# Patient Record
Sex: FEMALE | Race: WHITE | NOT HISPANIC OR LATINO
[De-identification: names, ages, dates, MRNs, and addresses within clinical notes are randomized per-mention and may not be internally consistent; named-entity substitution may affect disease eponyms.]

---

## 2017-01-03 ENCOUNTER — CHART COPY (OUTPATIENT)
Age: 37
End: 2017-01-03

## 2017-01-06 ENCOUNTER — APPOINTMENT (OUTPATIENT)
Dept: ORTHOPEDIC SURGERY | Facility: CLINIC | Age: 37
End: 2017-01-06

## 2017-01-22 ENCOUNTER — FORM ENCOUNTER (OUTPATIENT)
Age: 37
End: 2017-01-22

## 2017-01-23 ENCOUNTER — APPOINTMENT (OUTPATIENT)
Dept: INTERNAL MEDICINE | Facility: CLINIC | Age: 37
End: 2017-01-23

## 2017-01-23 ENCOUNTER — APPOINTMENT (OUTPATIENT)
Dept: RADIOLOGY | Facility: CLINIC | Age: 37
End: 2017-01-23

## 2017-01-23 ENCOUNTER — OUTPATIENT (OUTPATIENT)
Dept: OUTPATIENT SERVICES | Facility: HOSPITAL | Age: 37
LOS: 1 days | End: 2017-01-23
Payer: MEDICAID

## 2017-01-23 VITALS
WEIGHT: 232 LBS | HEART RATE: 83 BPM | RESPIRATION RATE: 14 BRPM | OXYGEN SATURATION: 98 % | HEIGHT: 69 IN | SYSTOLIC BLOOD PRESSURE: 130 MMHG | DIASTOLIC BLOOD PRESSURE: 90 MMHG | BODY MASS INDEX: 34.36 KG/M2 | TEMPERATURE: 98.3 F

## 2017-01-23 VITALS — DIASTOLIC BLOOD PRESSURE: 74 MMHG | SYSTOLIC BLOOD PRESSURE: 116 MMHG

## 2017-01-23 DIAGNOSIS — Z90.89 ACQUIRED ABSENCE OF OTHER ORGANS: Chronic | ICD-10-CM

## 2017-01-23 DIAGNOSIS — Z98.1 ARTHRODESIS STATUS: Chronic | ICD-10-CM

## 2017-01-23 DIAGNOSIS — J20.9 ACUTE BRONCHITIS, UNSPECIFIED: ICD-10-CM

## 2017-01-23 PROCEDURE — 71046 X-RAY EXAM CHEST 2 VIEWS: CPT

## 2017-01-23 RX ORDER — BUDESONIDE AND FORMOTEROL FUMARATE DIHYDRATE 160; 4.5 UG/1; UG/1
160-4.5 AEROSOL RESPIRATORY (INHALATION) TWICE DAILY
Qty: 1 | Refills: 0 | Status: COMPLETED | COMMUNITY
Start: 2017-01-23 | End: 2017-02-02

## 2017-01-23 RX ORDER — CLARITHROMYCIN 500 MG/1
500 TABLET, FILM COATED ORAL TWICE DAILY
Qty: 20 | Refills: 0 | Status: COMPLETED | COMMUNITY
Start: 2017-01-23 | End: 2017-02-02

## 2017-01-23 RX ORDER — BENZONATATE 100 MG/1
100 CAPSULE ORAL
Qty: 30 | Refills: 0 | Status: COMPLETED | COMMUNITY
Start: 2017-01-23 | End: 2017-02-02

## 2017-02-03 ENCOUNTER — APPOINTMENT (OUTPATIENT)
Dept: INTERNAL MEDICINE | Facility: CLINIC | Age: 37
End: 2017-02-03

## 2017-05-26 ENCOUNTER — APPOINTMENT (OUTPATIENT)
Dept: ORTHOPEDIC SURGERY | Facility: CLINIC | Age: 37
End: 2017-05-26

## 2017-05-26 ENCOUNTER — NON-APPOINTMENT (OUTPATIENT)
Age: 37
End: 2017-05-26

## 2017-05-26 ENCOUNTER — APPOINTMENT (OUTPATIENT)
Dept: INTERNAL MEDICINE | Facility: CLINIC | Age: 37
End: 2017-05-26

## 2017-05-26 VITALS
HEART RATE: 54 BPM | HEIGHT: 69 IN | WEIGHT: 234 LBS | SYSTOLIC BLOOD PRESSURE: 110 MMHG | RESPIRATION RATE: 14 BRPM | DIASTOLIC BLOOD PRESSURE: 80 MMHG | BODY MASS INDEX: 34.66 KG/M2 | OXYGEN SATURATION: 99 % | TEMPERATURE: 98.2 F

## 2017-05-26 VITALS
BODY MASS INDEX: 34.8 KG/M2 | HEIGHT: 69 IN | WEIGHT: 235 LBS | SYSTOLIC BLOOD PRESSURE: 136 MMHG | DIASTOLIC BLOOD PRESSURE: 93 MMHG | HEART RATE: 60 BPM

## 2017-05-26 RX ORDER — ALBUTEROL SULFATE 90 UG/1
108 (90 BASE) AEROSOL, METERED RESPIRATORY (INHALATION)
Qty: 18 | Refills: 0 | Status: DISCONTINUED | COMMUNITY
Start: 2017-01-18 | End: 2017-05-26

## 2017-05-26 RX ORDER — PREDNISONE 20 MG/1
20 TABLET ORAL
Qty: 10 | Refills: 0 | Status: DISCONTINUED | COMMUNITY
Start: 2017-01-18 | End: 2017-05-26

## 2017-05-30 LAB
25(OH)D3 SERPL-MCNC: 27.8 NG/ML
ALBUMIN SERPL ELPH-MCNC: 4.4 G/DL
ALP BLD-CCNC: 58 U/L
ALT SERPL-CCNC: 20 U/L
ANION GAP SERPL CALC-SCNC: 16 MMOL/L
APPEARANCE: CLEAR
AST SERPL-CCNC: 16 U/L
BACTERIA: NEGATIVE
BASOPHILS # BLD AUTO: 0.03 K/UL
BASOPHILS NFR BLD AUTO: 0.5 %
BILIRUB SERPL-MCNC: 0.6 MG/DL
BILIRUBIN URINE: NEGATIVE
BLOOD URINE: NEGATIVE
BUN SERPL-MCNC: 13 MG/DL
CALCIUM SERPL-MCNC: 9.3 MG/DL
CHLORIDE SERPL-SCNC: 100 MMOL/L
CHOLEST SERPL-MCNC: 247 MG/DL
CHOLEST/HDLC SERPL: 2.7 RATIO
CO2 SERPL-SCNC: 24 MMOL/L
COLOR: YELLOW
CREAT SERPL-MCNC: 0.75 MG/DL
EOSINOPHIL # BLD AUTO: 0.05 K/UL
EOSINOPHIL NFR BLD AUTO: 0.8 %
FOLATE SERPL-MCNC: 11.5 NG/ML
GLUCOSE QUALITATIVE U: NORMAL MG/DL
GLUCOSE SERPL-MCNC: 84 MG/DL
HBA1C MFR BLD HPLC: 5.3 %
HCT VFR BLD CALC: 44 %
HDLC SERPL-MCNC: 92 MG/DL
HGB BLD-MCNC: 14.1 G/DL
HYALINE CASTS: 0 /LPF
IMM GRANULOCYTES NFR BLD AUTO: 0.2 %
KETONES URINE: NEGATIVE
LDLC SERPL CALC-MCNC: 143 MG/DL
LEUKOCYTE ESTERASE URINE: NEGATIVE
LYMPHOCYTES # BLD AUTO: 1.82 K/UL
LYMPHOCYTES NFR BLD AUTO: 29.8 %
MAN DIFF?: NORMAL
MCHC RBC-ENTMCNC: 29.6 PG
MCHC RBC-ENTMCNC: 32 GM/DL
MCV RBC AUTO: 92.4 FL
MICROSCOPIC-UA: NORMAL
MONOCYTES # BLD AUTO: 0.31 K/UL
MONOCYTES NFR BLD AUTO: 5.1 %
NEUTROPHILS # BLD AUTO: 3.89 K/UL
NEUTROPHILS NFR BLD AUTO: 63.6 %
NITRITE URINE: NEGATIVE
PH URINE: 7
PLATELET # BLD AUTO: 272 K/UL
POTASSIUM SERPL-SCNC: 4.6 MMOL/L
PROT SERPL-MCNC: 7.4 G/DL
PROTEIN URINE: NEGATIVE MG/DL
RBC # BLD: 4.76 M/UL
RBC # FLD: 13 %
RED BLOOD CELLS URINE: 1 /HPF
SODIUM SERPL-SCNC: 140 MMOL/L
SPECIFIC GRAVITY URINE: 1.01
SQUAMOUS EPITHELIAL CELLS: 1 /HPF
TRIGL SERPL-MCNC: 61 MG/DL
TSH SERPL-ACNC: 2.78 UIU/ML
UROBILINOGEN URINE: NORMAL MG/DL
VIT B12 SERPL-MCNC: 639 PG/ML
WBC # FLD AUTO: 6.11 K/UL
WHITE BLOOD CELLS URINE: 1 /HPF

## 2017-06-04 ENCOUNTER — TRANSCRIPTION ENCOUNTER (OUTPATIENT)
Age: 37
End: 2017-06-04

## 2017-07-19 ENCOUNTER — TRANSCRIPTION ENCOUNTER (OUTPATIENT)
Age: 37
End: 2017-07-19

## 2017-07-23 ENCOUNTER — TRANSCRIPTION ENCOUNTER (OUTPATIENT)
Age: 37
End: 2017-07-23

## 2017-08-02 ENCOUNTER — APPOINTMENT (OUTPATIENT)
Dept: ORTHOPEDIC SURGERY | Facility: CLINIC | Age: 37
End: 2017-08-02
Payer: COMMERCIAL

## 2017-08-02 VITALS
HEIGHT: 69 IN | BODY MASS INDEX: 34.96 KG/M2 | HEART RATE: 80 BPM | SYSTOLIC BLOOD PRESSURE: 125 MMHG | WEIGHT: 236 LBS | DIASTOLIC BLOOD PRESSURE: 85 MMHG

## 2017-08-02 PROCEDURE — 99213 OFFICE O/P EST LOW 20 MIN: CPT

## 2017-08-02 PROCEDURE — 72170 X-RAY EXAM OF PELVIS: CPT | Mod: 59

## 2017-08-02 PROCEDURE — 72110 X-RAY EXAM L-2 SPINE 4/>VWS: CPT

## 2017-08-02 PROCEDURE — 72050 X-RAY EXAM NECK SPINE 4/5VWS: CPT
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
## 2017-08-02 NOTE — PHYSICAL EXAM
[Limited] : is limited [Painful] : is painful [UE/LE] : Motor: 5/5 motor strength in bilateral upper & lower extremities [] : Sensory: [Poor Appearance] : well-appearing [Acute Distress] : not in acute distress [Abl Mood] : in a normal mood [Abl Affect] : with normal affect [Poor Coordination] : normal coordination [Disorientation] : oriented x 3 [Normal] : normal [Zamorano's Sign] : negative Zamorano's sign [SLR] : negative straight leg raise [Bicep] : biceps 2+ and symmetric bilaterally [B.R.] : biceps 2+ and symmetric bilaterally [Tricep] : triceps 2+ and symmetric bilaterally [Knee] : patellar 2+ and symmetric bilaterally [Ankle] : ankle 2+ and symmetric bilaterally [DP] : dorsalis pedis 2+ and symmetric bilaterally [PT] : posterior tibial 2+ and symmetric bilaterally [Rad] : radial 2+ and symmetric bilaterally [FreeTextEntry2] : The pt is awake, alert and oriented to self, place and time, is comfortable and in no acute distress. Gait examination reveals a narrow based, non-ataxic, non-antalgic gait. The pt can heel and toe walk without difficulty. No rashes or ecchymotic lesions noted over the neck, back and lower extremities bilaterally. No obvious abnormal spinal curvature in the sagittal and coronal planes. No tenderness over the cervical, thoracic or lumbar spine, or upper and lower extremity musculature. Paraspinal cervical tenderness. There is no sacroiliac tenderness bilaterally. No tenderness over the greater trochanter bilaterally. No atrophy or abnormal movements noted in the upper or lower extremities bilaterally. No swelling seen in the upper or lower extremities bilaterally. No joint laxity noted in the upper and lower extremity joints bilaterally.\par No cervical lymphadenopathy noted anteriorly. \par Full range of motion of both shoulders. Negative Spurling's sign bilaterally. There is a negative Neer's sign and Hawkin's sign bilaterally. \par Range of motion of hip is internal rotation 20 degrees,  30° external rotation without pain\par Negative straight leg raise to 60° in the supine position. No groin pain with hip internal rotation, negative BOBBY test bilaterally. There are 2+ DP pulses bilaterally. There is a negative Babinski sign and no clonus bilaterally in the upper or lower extremities. [de-identified] : extension of C spine and left lateral rotation limited. Flexion to her mid tibia and ext 30 dehrees with some lefrt sided low back pain [de-identified] : left trochanteric tenderness [de-identified] : 4 view cervical spine demonstrate no significant scoliosis. Straightening of cervical lordosis. No acute fractures. No dynamic instability between flexion and extension. Improvement of cervical lordosis in extension\par \par 4 views lumbar spine demonstrated left-sided lumbar curve with apex of the L1-2 level. Straightening of lumbar lordosis. Between flexion-extension no dynamic. No acute fractures.\par \par AP pelvis demonstrates normal appearance of the hips bilaterally. No significant degenerative changes. No acute fracture

## 2017-08-02 NOTE — CONSULT LETTER
[Dear  ___] : Dear  [unfilled], [I had the pleasure of evaluating your patient, [unfilled].] : I had the pleasure of evaluating your patient, [unfilled]. [FreeTextEntry2] : Imelda Hoover [FreeTextEntry1] : Thank you for this referral. I have enclosed my note for your review. Please feel free to contact my office if you have additional questions regarding this patient.\par \par Regards,\par Srinivas Diaz MD, FACS, FAAOS\par \par  of Orthopaedic Surgery\par [employer]\par Spinal Reconstruction Surgery\par Minimally Invasive Spinal Surgery\par [facility]
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
## 2017-08-25 ENCOUNTER — TRANSCRIPTION ENCOUNTER (OUTPATIENT)
Age: 37
End: 2017-08-25

## 2017-08-28 ENCOUNTER — APPOINTMENT (OUTPATIENT)
Dept: INTERNAL MEDICINE | Facility: CLINIC | Age: 37
End: 2017-08-28
Payer: MEDICAID

## 2017-08-28 VITALS
HEIGHT: 69 IN | TEMPERATURE: 99 F | DIASTOLIC BLOOD PRESSURE: 90 MMHG | BODY MASS INDEX: 34.8 KG/M2 | RESPIRATION RATE: 14 BRPM | OXYGEN SATURATION: 98 % | HEART RATE: 83 BPM | WEIGHT: 235 LBS | SYSTOLIC BLOOD PRESSURE: 124 MMHG

## 2017-08-28 PROCEDURE — 99214 OFFICE O/P EST MOD 30 MIN: CPT

## 2017-09-06 ENCOUNTER — OTHER (OUTPATIENT)
Age: 37
End: 2017-09-06

## 2017-09-06 ENCOUNTER — MEDICATION RENEWAL (OUTPATIENT)
Age: 37
End: 2017-09-06

## 2017-09-06 RX ORDER — MOMETASONE 50 UG/1
50 SPRAY, METERED NASAL
Qty: 1 | Refills: 0 | Status: DISCONTINUED | COMMUNITY
Start: 2017-08-28 | End: 2017-09-06

## 2017-09-21 ENCOUNTER — APPOINTMENT (OUTPATIENT)
Dept: PEDIATRIC ALLERGY IMMUNOLOGY | Facility: CLINIC | Age: 37
End: 2017-09-21

## 2017-12-19 ENCOUNTER — TRANSCRIPTION ENCOUNTER (OUTPATIENT)
Age: 37
End: 2017-12-19

## 2018-01-24 ENCOUNTER — TRANSCRIPTION ENCOUNTER (OUTPATIENT)
Age: 38
End: 2018-01-24

## 2018-03-07 ENCOUNTER — APPOINTMENT (OUTPATIENT)
Dept: OTOLARYNGOLOGY | Facility: CLINIC | Age: 38
End: 2018-03-07

## 2018-03-31 ENCOUNTER — APPOINTMENT (OUTPATIENT)
Dept: OBGYN | Facility: CLINIC | Age: 38
End: 2018-03-31

## 2018-04-07 ENCOUNTER — RESULT REVIEW (OUTPATIENT)
Age: 38
End: 2018-04-07

## 2018-04-23 ENCOUNTER — TRANSCRIPTION ENCOUNTER (OUTPATIENT)
Age: 38
End: 2018-04-23

## 2018-06-05 ENCOUNTER — APPOINTMENT (OUTPATIENT)
Dept: ORTHOPEDIC SURGERY | Facility: CLINIC | Age: 38
End: 2018-06-05
Payer: MEDICAID

## 2018-06-05 VITALS
DIASTOLIC BLOOD PRESSURE: 88 MMHG | HEART RATE: 68 BPM | BODY MASS INDEX: 35.55 KG/M2 | SYSTOLIC BLOOD PRESSURE: 132 MMHG | HEIGHT: 69 IN | WEIGHT: 240 LBS

## 2018-06-05 PROCEDURE — 99213 OFFICE O/P EST LOW 20 MIN: CPT

## 2018-06-05 PROCEDURE — 73610 X-RAY EXAM OF ANKLE: CPT | Mod: LT

## 2018-06-05 RX ORDER — METRONIDAZOLE 7.5 MG/G
0.75 GEL VAGINAL
Qty: 70 | Refills: 0 | Status: DISCONTINUED | COMMUNITY
Start: 2018-04-07

## 2018-06-05 RX ORDER — DICLOFENAC SODIUM 10 MG/G
1 GEL TOPICAL TWICE DAILY
Qty: 1 | Refills: 2 | Status: DISCONTINUED | COMMUNITY
Start: 2017-08-02 | End: 2018-06-05

## 2018-06-05 RX ORDER — SULFAMETHOXAZOLE AND TRIMETHOPRIM 800; 160 MG/1; MG/1
800-160 TABLET ORAL
Qty: 20 | Refills: 0 | Status: DISCONTINUED | COMMUNITY
Start: 2018-01-24

## 2018-06-05 RX ORDER — FLUTICASONE PROPIONATE 50 UG/1
50 SPRAY, METERED NASAL
Qty: 16 | Refills: 0 | Status: DISCONTINUED | COMMUNITY
Start: 2017-12-17

## 2018-06-05 RX ORDER — TRIAMCINOLONE ACETONIDE 55 UG/1
55 SPRAY, METERED NASAL DAILY
Qty: 1 | Refills: 0 | Status: DISCONTINUED | COMMUNITY
Start: 2017-09-06 | End: 2018-06-05

## 2018-06-05 RX ORDER — CELECOXIB 100 MG/1
100 CAPSULE ORAL
Qty: 60 | Refills: 2 | Status: DISCONTINUED | COMMUNITY
Start: 2017-08-02 | End: 2018-06-05

## 2018-06-05 RX ORDER — CYCLOBENZAPRINE HYDROCHLORIDE 10 MG/1
10 TABLET, FILM COATED ORAL
Qty: 60 | Refills: 2 | Status: DISCONTINUED | COMMUNITY
Start: 2017-08-02 | End: 2018-06-05

## 2018-06-05 RX ORDER — METHYLPREDNISOLONE 4 MG/1
4 TABLET ORAL
Qty: 21 | Refills: 0 | Status: DISCONTINUED | COMMUNITY
Start: 2018-04-23

## 2018-06-09 ENCOUNTER — FORM ENCOUNTER (OUTPATIENT)
Age: 38
End: 2018-06-09

## 2018-06-10 ENCOUNTER — OUTPATIENT (OUTPATIENT)
Dept: OUTPATIENT SERVICES | Facility: HOSPITAL | Age: 38
LOS: 1 days | End: 2018-06-10
Payer: MEDICAID

## 2018-06-10 ENCOUNTER — APPOINTMENT (OUTPATIENT)
Dept: MRI IMAGING | Facility: CLINIC | Age: 38
End: 2018-06-10
Payer: MEDICAID

## 2018-06-10 DIAGNOSIS — Z98.1 ARTHRODESIS STATUS: Chronic | ICD-10-CM

## 2018-06-10 DIAGNOSIS — M25.572 PAIN IN LEFT ANKLE AND JOINTS OF LEFT FOOT: ICD-10-CM

## 2018-06-10 DIAGNOSIS — Z90.89 ACQUIRED ABSENCE OF OTHER ORGANS: Chronic | ICD-10-CM

## 2018-06-10 PROCEDURE — 73721 MRI JNT OF LWR EXTRE W/O DYE: CPT

## 2018-06-10 PROCEDURE — 73721 MRI JNT OF LWR EXTRE W/O DYE: CPT | Mod: 26,LT

## 2018-06-19 ENCOUNTER — CHART COPY (OUTPATIENT)
Age: 38
End: 2018-06-19

## 2018-07-04 ENCOUNTER — TRANSCRIPTION ENCOUNTER (OUTPATIENT)
Age: 38
End: 2018-07-04

## 2018-07-20 ENCOUNTER — TRANSCRIPTION ENCOUNTER (OUTPATIENT)
Age: 38
End: 2018-07-20

## 2018-07-24 ENCOUNTER — APPOINTMENT (OUTPATIENT)
Dept: ORTHOPEDIC SURGERY | Facility: CLINIC | Age: 38
End: 2018-07-24
Payer: MEDICAID

## 2018-07-24 VITALS
SYSTOLIC BLOOD PRESSURE: 109 MMHG | HEART RATE: 73 BPM | DIASTOLIC BLOOD PRESSURE: 73 MMHG | WEIGHT: 240 LBS | BODY MASS INDEX: 35.55 KG/M2 | HEIGHT: 69 IN

## 2018-07-24 PROCEDURE — 99213 OFFICE O/P EST LOW 20 MIN: CPT

## 2018-09-18 ENCOUNTER — APPOINTMENT (OUTPATIENT)
Dept: ORTHOPEDIC SURGERY | Facility: CLINIC | Age: 38
End: 2018-09-18

## 2018-11-21 ENCOUNTER — TRANSCRIPTION ENCOUNTER (OUTPATIENT)
Age: 38
End: 2018-11-21

## 2019-01-04 ENCOUNTER — OTHER (OUTPATIENT)
Age: 39
End: 2019-01-04

## 2019-01-04 PROBLEM — N94.3 PREMENSTRUAL SYNDROME: Status: ACTIVE | Noted: 2019-01-04

## 2019-01-04 PROBLEM — N94.6 DYSMENORRHEA: Status: ACTIVE | Noted: 2019-01-04

## 2019-01-04 PROBLEM — Z83.3 FAMILY HISTORY OF DIABETES MELLITUS: Status: ACTIVE | Noted: 2019-01-04

## 2019-01-04 PROBLEM — Z80.3 FAMILY HISTORY OF MALIGNANT NEOPLASM OF BREAST: Status: ACTIVE | Noted: 2019-01-04

## 2019-01-04 PROBLEM — Z80.52 FAMILY HISTORY OF MALIGNANT NEOPLASM OF URINARY BLADDER: Status: ACTIVE | Noted: 2019-01-04

## 2019-01-04 PROBLEM — Z80.41 FAMILY HISTORY OF MALIGNANT NEOPLASM OF OVARY: Status: ACTIVE | Noted: 2019-01-04

## 2019-01-04 PROBLEM — Z80.1 FAMILY HISTORY OF LUNG CANCER: Status: ACTIVE | Noted: 2019-01-04

## 2019-01-04 LAB — CYTOLOGY CVX/VAG DOC THIN PREP: NORMAL

## 2019-01-07 ENCOUNTER — APPOINTMENT (OUTPATIENT)
Dept: OBGYN | Facility: CLINIC | Age: 39
End: 2019-01-07
Payer: MEDICAID

## 2019-01-07 ENCOUNTER — LABORATORY RESULT (OUTPATIENT)
Age: 39
End: 2019-01-07

## 2019-01-07 VITALS
DIASTOLIC BLOOD PRESSURE: 80 MMHG | HEIGHT: 69 IN | WEIGHT: 261 LBS | SYSTOLIC BLOOD PRESSURE: 120 MMHG | BODY MASS INDEX: 38.66 KG/M2

## 2019-01-07 DIAGNOSIS — Z80.41 FAMILY HISTORY OF MALIGNANT NEOPLASM OF OVARY: ICD-10-CM

## 2019-01-07 DIAGNOSIS — Z83.3 FAMILY HISTORY OF DIABETES MELLITUS: ICD-10-CM

## 2019-01-07 DIAGNOSIS — Z80.3 FAMILY HISTORY OF MALIGNANT NEOPLASM OF BREAST: ICD-10-CM

## 2019-01-07 DIAGNOSIS — N92.0 EXCESSIVE AND FREQUENT MENSTRUATION WITH REGULAR CYCLE: ICD-10-CM

## 2019-01-07 DIAGNOSIS — Z80.1 FAMILY HISTORY OF MALIGNANT NEOPLASM OF TRACHEA, BRONCHUS AND LUNG: ICD-10-CM

## 2019-01-07 DIAGNOSIS — N94.3 PREMENSTRUAL TENSION SYNDROME: ICD-10-CM

## 2019-01-07 DIAGNOSIS — N94.6 DYSMENORRHEA, UNSPECIFIED: ICD-10-CM

## 2019-01-07 DIAGNOSIS — Z80.52 FAMILY HISTORY OF MALIGNANT NEOPLASM OF BLADDER: ICD-10-CM

## 2019-01-07 LAB
BILIRUB UR QL STRIP: NORMAL
CLARITY UR: CLEAR
COLLECTION METHOD: NORMAL
GLUCOSE UR-MCNC: NORMAL
HCG UR QL: 0.2 EU/DL
HCG UR QL: NEGATIVE
HGB UR QL STRIP.AUTO: ABNORMAL
KETONES UR-MCNC: NORMAL
LEUKOCYTE ESTERASE UR QL STRIP: ABNORMAL
NITRITE UR QL STRIP: NORMAL
PH UR STRIP: 5.5
PROT UR STRIP-MCNC: NORMAL
QUALITY CONTROL: YES
SP GR UR STRIP: 1

## 2019-01-07 PROCEDURE — 36415 COLL VENOUS BLD VENIPUNCTURE: CPT

## 2019-01-07 PROCEDURE — 81025 URINE PREGNANCY TEST: CPT

## 2019-01-07 PROCEDURE — 99213 OFFICE O/P EST LOW 20 MIN: CPT

## 2019-01-07 PROCEDURE — 81003 URINALYSIS AUTO W/O SCOPE: CPT | Mod: QW

## 2019-01-07 NOTE — DISCUSSION/SUMMARY
[FreeTextEntry1] : we discussed options to help with her frequent menses. \par \par labs obtained and pt will return for a pelvic US.\par \par if labs and sono normal, pt will keep a menstrual calendar for the next 3 mos, and if menses continue to be q18 days she will return for management.  We discussed ocps vs IUD.

## 2019-01-07 NOTE — HISTORY OF PRESENT ILLNESS
[Irregular Menses] : irregular menses [Definite:  ___ (Date)] : the last menstrual period was [unfilled] [Menarche Age: ____] : age at menarche was [unfilled] [Sexually Active] : is sexually active [Contraception] : does not use contraception

## 2019-01-09 ENCOUNTER — APPOINTMENT (OUTPATIENT)
Dept: OBGYN | Facility: CLINIC | Age: 39
End: 2019-01-09
Payer: MEDICAID

## 2019-01-09 ENCOUNTER — ASOB RESULT (OUTPATIENT)
Age: 39
End: 2019-01-09

## 2019-01-09 PROCEDURE — 76830 TRANSVAGINAL US NON-OB: CPT

## 2019-01-10 LAB
BASOPHILS # BLD AUTO: 0.04 K/UL
BASOPHILS NFR BLD AUTO: 0.4 %
EOSINOPHIL # BLD AUTO: 0.14 K/UL
EOSINOPHIL NFR BLD AUTO: 1.3 %
HCT VFR BLD CALC: 42.3 %
HGB BLD-MCNC: 13.9 G/DL
IMM GRANULOCYTES NFR BLD AUTO: 0.3 %
LYMPHOCYTES # BLD AUTO: 3.1 K/UL
LYMPHOCYTES NFR BLD AUTO: 29.8 %
MAN DIFF?: NORMAL
MCHC RBC-ENTMCNC: 30.6 PG
MCHC RBC-ENTMCNC: 32.9 GM/DL
MCV RBC AUTO: 93.2 FL
MONOCYTES # BLD AUTO: 0.76 K/UL
MONOCYTES NFR BLD AUTO: 7.3 %
NEUTROPHILS # BLD AUTO: 6.34 K/UL
NEUTROPHILS NFR BLD AUTO: 60.9 %
PLATELET # BLD AUTO: 314 K/UL
PROLACTIN SERPL-MCNC: 18.9 NG/ML
RBC # BLD: 4.54 M/UL
RBC # FLD: 13.9 %
TSH SERPL-ACNC: 8.27 UIU/ML
WBC # FLD AUTO: 10.41 K/UL

## 2019-01-25 ENCOUNTER — LABORATORY RESULT (OUTPATIENT)
Age: 39
End: 2019-01-25

## 2019-01-25 ENCOUNTER — APPOINTMENT (OUTPATIENT)
Dept: INTERNAL MEDICINE | Facility: CLINIC | Age: 39
End: 2019-01-25
Payer: MEDICAID

## 2019-01-25 VITALS
HEART RATE: 78 BPM | OXYGEN SATURATION: 98 % | HEIGHT: 69 IN | SYSTOLIC BLOOD PRESSURE: 126 MMHG | DIASTOLIC BLOOD PRESSURE: 84 MMHG | TEMPERATURE: 98.1 F | RESPIRATION RATE: 14 BRPM

## 2019-01-25 PROCEDURE — 99213 OFFICE O/P EST LOW 20 MIN: CPT

## 2019-01-25 RX ORDER — MELOXICAM 15 MG/1
15 TABLET ORAL DAILY
Qty: 30 | Refills: 0 | Status: DISCONTINUED | COMMUNITY
Start: 2018-06-19 | End: 2019-01-25

## 2019-01-25 NOTE — HISTORY OF PRESENT ILLNESS
[de-identified] : pt here today for repeat thyroid check.\par Pt found to have low thyroid at GYN office\par Pt had menstrual irregular.

## 2019-01-28 LAB
T3RU NFR SERPL: 1.14 INDEX
T4 FREE SERPL-MCNC: 1.1 NG/DL
THYROGLOB AB SERPL-ACNC: <20 IU/ML
THYROPEROXIDASE AB SERPL IA-ACNC: <10 IU/ML
TSH SERPL-ACNC: 1.92 UIU/ML

## 2019-02-03 ENCOUNTER — TRANSCRIPTION ENCOUNTER (OUTPATIENT)
Age: 39
End: 2019-02-03

## 2019-02-28 ENCOUNTER — TRANSCRIPTION ENCOUNTER (OUTPATIENT)
Age: 39
End: 2019-02-28

## 2019-04-12 ENCOUNTER — APPOINTMENT (OUTPATIENT)
Dept: INTERNAL MEDICINE | Facility: CLINIC | Age: 39
End: 2019-04-12
Payer: MEDICAID

## 2019-04-12 VITALS
RESPIRATION RATE: 14 BRPM | WEIGHT: 260 LBS | TEMPERATURE: 97.8 F | SYSTOLIC BLOOD PRESSURE: 120 MMHG | OXYGEN SATURATION: 97 % | HEIGHT: 69 IN | DIASTOLIC BLOOD PRESSURE: 90 MMHG | HEART RATE: 68 BPM | BODY MASS INDEX: 38.51 KG/M2

## 2019-04-12 DIAGNOSIS — Z00.00 ENCOUNTER FOR GENERAL ADULT MEDICAL EXAMINATION W/OUT ABNORMAL FINDINGS: ICD-10-CM

## 2019-04-12 DIAGNOSIS — J30.9 ALLERGIC RHINITIS, UNSPECIFIED: ICD-10-CM

## 2019-04-12 PROCEDURE — 99395 PREV VISIT EST AGE 18-39: CPT

## 2019-04-12 RX ORDER — ONDANSETRON 4 MG/1
4 TABLET, ORALLY DISINTEGRATING ORAL
Qty: 6 | Refills: 0 | Status: DISCONTINUED | COMMUNITY
Start: 2019-02-28

## 2019-04-12 RX ORDER — FLUOXETINE HYDROCHLORIDE 20 MG/1
20 TABLET ORAL
Refills: 0 | Status: DISCONTINUED | COMMUNITY
End: 2019-04-12

## 2019-04-12 NOTE — PHYSICAL EXAM
[No Acute Distress] : no acute distress [Well Nourished] : well nourished [Well Developed] : well developed [Well-Appearing] : well-appearing [Normal Sclera/Conjunctiva] : normal sclera/conjunctiva [Normal Outer Ear/Nose] : the outer ears and nose were normal in appearance [PERRL] : pupils equal round and reactive to light [EOMI] : extraocular movements intact [No JVD] : no jugular venous distention [Normal Oropharynx] : the oropharynx was normal [Supple] : supple [No Lymphadenopathy] : no lymphadenopathy [Thyroid Normal, No Nodules] : the thyroid was normal and there were no nodules present [No Respiratory Distress] : no respiratory distress  [No Accessory Muscle Use] : no accessory muscle use [Clear to Auscultation] : lungs were clear to auscultation bilaterally [Regular Rhythm] : with a regular rhythm [Normal S1, S2] : normal S1 and S2 [Normal Rate] : normal rate  [No Murmur] : no murmur heard [No Carotid Bruits] : no carotid bruits [No Abdominal Bruit] : a ~M bruit was not heard ~T in the abdomen [No Varicosities] : no varicosities [Pedal Pulses Present] : the pedal pulses are present [No Edema] : there was no peripheral edema [No Extremity Clubbing/Cyanosis] : no extremity clubbing/cyanosis [No Palpable Aorta] : no palpable aorta [Normal Appearance] : normal in appearance [No Axillary Lymphadenopathy] : no axillary lymphadenopathy [Non Tender] : non-tender [Soft] : abdomen soft [Non-distended] : non-distended [No Masses] : no abdominal mass palpated [No HSM] : no HSM [Normal Bowel Sounds] : normal bowel sounds [Normal Anterior Cervical Nodes] : no anterior cervical lymphadenopathy [Normal Posterior Cervical Nodes] : no posterior cervical lymphadenopathy [No CVA Tenderness] : no CVA  tenderness [No Spinal Tenderness] : no spinal tenderness [No Joint Swelling] : no joint swelling [No Rash] : no rash [Grossly Normal Strength/Tone] : grossly normal strength/tone [No Focal Deficits] : no focal deficits [Coordination Grossly Intact] : coordination grossly intact [Normal Gait] : normal gait [Deep Tendon Reflexes (DTR)] : deep tendon reflexes were 2+ and symmetric [Normal Affect] : the affect was normal [Normal Insight/Judgement] : insight and judgment were intact

## 2019-04-13 ENCOUNTER — TRANSCRIPTION ENCOUNTER (OUTPATIENT)
Age: 39
End: 2019-04-13

## 2019-04-13 LAB
25(OH)D3 SERPL-MCNC: 23.8 NG/ML
ALBUMIN SERPL ELPH-MCNC: 4.5 G/DL
ALP BLD-CCNC: 57 U/L
ALT SERPL-CCNC: 13 U/L
ANION GAP SERPL CALC-SCNC: 13 MMOL/L
AST SERPL-CCNC: 16 U/L
BASOPHILS # BLD AUTO: 0.05 K/UL
BASOPHILS NFR BLD AUTO: 0.8 %
BILIRUB SERPL-MCNC: 0.4 MG/DL
BUN SERPL-MCNC: 13 MG/DL
CALCIUM SERPL-MCNC: 9.5 MG/DL
CHLORIDE SERPL-SCNC: 103 MMOL/L
CHOLEST SERPL-MCNC: 239 MG/DL
CHOLEST/HDLC SERPL: 2.9 RATIO
CO2 SERPL-SCNC: 25 MMOL/L
CREAT SERPL-MCNC: 0.81 MG/DL
EOSINOPHIL # BLD AUTO: 0.06 K/UL
EOSINOPHIL NFR BLD AUTO: 0.9 %
ESTIMATED AVERAGE GLUCOSE: 103 MG/DL
FERRITIN SERPL-MCNC: 171 NG/ML
FOLATE SERPL-MCNC: 15.2 NG/ML
GLUCOSE SERPL-MCNC: 87 MG/DL
HBA1C MFR BLD HPLC: 5.2 %
HCT VFR BLD CALC: 42.2 %
HDLC SERPL-MCNC: 82 MG/DL
HGB BLD-MCNC: 13.6 G/DL
IMM GRANULOCYTES NFR BLD AUTO: 0.3 %
LDLC SERPL CALC-MCNC: 144 MG/DL
LYMPHOCYTES # BLD AUTO: 1.94 K/UL
LYMPHOCYTES NFR BLD AUTO: 30.2 %
MAN DIFF?: NORMAL
MCHC RBC-ENTMCNC: 30.2 PG
MCHC RBC-ENTMCNC: 32.2 GM/DL
MCV RBC AUTO: 93.8 FL
MONOCYTES # BLD AUTO: 0.51 K/UL
MONOCYTES NFR BLD AUTO: 7.9 %
NEUTROPHILS # BLD AUTO: 3.85 K/UL
NEUTROPHILS NFR BLD AUTO: 59.9 %
PLATELET # BLD AUTO: 259 K/UL
POTASSIUM SERPL-SCNC: 4.7 MMOL/L
PROT SERPL-MCNC: 7.5 G/DL
RBC # BLD: 4.5 M/UL
RBC # FLD: 12.3 %
SODIUM SERPL-SCNC: 141 MMOL/L
T4 SERPL-MCNC: 5.8 UG/DL
THYROGLOB AB SERPL-ACNC: <20 IU/ML
THYROPEROXIDASE AB SERPL IA-ACNC: <10 IU/ML
TRIGL SERPL-MCNC: 65 MG/DL
TSH SERPL-ACNC: 2.75 UIU/ML
VIT B12 SERPL-MCNC: 878 PG/ML
WBC # FLD AUTO: 6.43 K/UL

## 2019-04-20 ENCOUNTER — EMERGENCY (EMERGENCY)
Facility: HOSPITAL | Age: 39
LOS: 0 days | Discharge: HOME | End: 2019-04-20
Attending: EMERGENCY MEDICINE | Admitting: EMERGENCY MEDICINE
Payer: MEDICAID

## 2019-04-20 ENCOUNTER — APPOINTMENT (OUTPATIENT)
Dept: OBGYN | Facility: CLINIC | Age: 39
End: 2019-04-20

## 2019-04-20 VITALS — HEIGHT: 69 IN | WEIGHT: 255.07 LBS

## 2019-04-20 VITALS
RESPIRATION RATE: 18 BRPM | SYSTOLIC BLOOD PRESSURE: 135 MMHG | DIASTOLIC BLOOD PRESSURE: 95 MMHG | TEMPERATURE: 98 F | HEART RATE: 64 BPM | OXYGEN SATURATION: 100 %

## 2019-04-20 DIAGNOSIS — Z90.89 ACQUIRED ABSENCE OF OTHER ORGANS: Chronic | ICD-10-CM

## 2019-04-20 DIAGNOSIS — Z88.0 ALLERGY STATUS TO PENICILLIN: ICD-10-CM

## 2019-04-20 DIAGNOSIS — Z98.1 ARTHRODESIS STATUS: Chronic | ICD-10-CM

## 2019-04-20 DIAGNOSIS — F07.81 POSTCONCUSSIONAL SYNDROME: ICD-10-CM

## 2019-04-20 DIAGNOSIS — R51 HEADACHE: ICD-10-CM

## 2019-04-20 PROCEDURE — 70450 CT HEAD/BRAIN W/O DYE: CPT | Mod: 26

## 2019-04-20 PROCEDURE — 99284 EMERGENCY DEPT VISIT MOD MDM: CPT

## 2019-04-20 RX ORDER — SODIUM CHLORIDE 9 MG/ML
1000 INJECTION INTRAMUSCULAR; INTRAVENOUS; SUBCUTANEOUS ONCE
Qty: 0 | Refills: 0 | Status: COMPLETED | OUTPATIENT
Start: 2019-04-20 | End: 2019-04-20

## 2019-04-20 RX ORDER — DIPHENHYDRAMINE HCL 50 MG
12.5 CAPSULE ORAL ONCE
Qty: 0 | Refills: 0 | Status: COMPLETED | OUTPATIENT
Start: 2019-04-20 | End: 2019-04-20

## 2019-04-20 RX ORDER — METOCLOPRAMIDE HCL 10 MG
10 TABLET ORAL ONCE
Qty: 0 | Refills: 0 | Status: COMPLETED | OUTPATIENT
Start: 2019-04-20 | End: 2019-04-20

## 2019-04-20 RX ORDER — KETOROLAC TROMETHAMINE 30 MG/ML
30 SYRINGE (ML) INJECTION ONCE
Qty: 0 | Refills: 0 | Status: DISCONTINUED | OUTPATIENT
Start: 2019-04-20 | End: 2019-04-20

## 2019-04-20 RX ADMIN — SODIUM CHLORIDE 1000 MILLILITER(S): 9 INJECTION INTRAMUSCULAR; INTRAVENOUS; SUBCUTANEOUS at 17:00

## 2019-04-20 RX ADMIN — Medication 12.5 MILLIGRAM(S): at 16:41

## 2019-04-20 RX ADMIN — Medication 30 MILLIGRAM(S): at 17:31

## 2019-04-20 RX ADMIN — SODIUM CHLORIDE 1000 MILLILITER(S): 9 INJECTION INTRAMUSCULAR; INTRAVENOUS; SUBCUTANEOUS at 16:32

## 2019-04-20 RX ADMIN — Medication 30 MILLIGRAM(S): at 17:43

## 2019-04-20 RX ADMIN — Medication 10 MILLIGRAM(S): at 16:41
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
## 2019-04-20 NOTE — ED ADULT NURSE NOTE - NSIMPLEMENTINTERV_GEN_ALL_ED
Implemented All Universal Safety Interventions:  [place] to call system. Call bell, personal items and telephone within reach. Instruct patient to call for assistance. Room bathroom lighting operational. Non-slip footwear when patient is off stretcher. Physically safe environment: no spills, clutter or unnecessary equipment. Stretcher in lowest position, wheels locked, appropriate side rails in place.

## 2019-04-20 NOTE — ED STATDOCS - PROGRESS NOTE DETAILS
Patient seen and evaluated with ED attending at intake, ED attending note and orders reviewed, will continue with patient follow up and care -Domenic Starks PA-C Patient feeling slightly better but not 100%.  CT head negative.  ucg negative.  Will add toradol and refer to concussion clinic -Domenic Starks PA-C

## 2019-04-20 NOTE — ED STATDOCS - PHYSICAL EXAMINATION
***GEN - NAD; well appearing; A+O x3   ***PULMONARY - CTA b/l, symmetric breath sounds. ***CARDIAC -s1s2, RRR, no M,G,R  ***ABDOMEN - ND, NT, soft, no guarding, no rebound, no catalina's   ***SKIN - no rash or bruising   ***NEUROLOGIC - alert and oriented, follows commands, sensation nl, motor nl, CN intact***PSYCH - insight and judgment nl, memory nl, affect nl, thought nl

## 2019-04-20 NOTE — ED STATDOCS - NS_ ATTENDINGSCRIBEDETAILS _ED_A_ED_FT
The scribe's documentation has been prepared under my direction and personally reviewed by me in its entirety. I confirm that the note above accurately reflects all work, treatment, procedures, and medical decision-making performed by me.  Jeffrey Hankins MD

## 2019-04-20 NOTE — ED STATDOCS - NSFOLLOWUPINSTRUCTIONS_ED_ALL_ED_FT
Concussion    WHAT YOU NEED TO KNOW:    A concussion is a mild brain injury. It is usually caused by a bump or blow to the head from a fall, a motor vehicle crash, or a sports injury. Sometimes being shaken forcefully may cause a concussion.    DISCHARGE INSTRUCTIONS:    Have someone call 911 for any of the following:     Someone tries to wake you and cannot do so.      You have a seizure, increasing confusion, or a change in personality.      Your speech becomes slurred, or you have new vision problems.    Return to the emergency department if:     You have sudden and new vision problems.      You have a severe headache that does not go away.      You have arm or leg weakness, numbness, or new problems with coordination.      You have blood or clear fluid coming out of the ears or nose.    Contact your healthcare provider if:     You have nausea or are vomiting.      You feel more sleepy than usual.      Your symptoms get worse.      Your symptoms last longer than 6 weeks after the injury.      You have questions or concerns about your condition or care.    Medicines: You may need any of the following:     Acetaminophen decreases pain and fever. It is available without a doctor's order. Ask how much to take and how often to take it. Follow directions. Read the labels of all other medicines you are using to see if they also contain acetaminophen, or ask your doctor or pharmacist. Acetaminophen can cause liver damage if not taken correctly. Do not use more than 4 grams (4,000 milligrams) total of acetaminophen in one day.       NSAIDs help decrease swelling and pain or fever. This medicine is available with or without a doctor's order. NSAIDs can cause stomach bleeding or kidney problems in certain people. If you take blood thinner medicine, always ask your healthcare provider if NSAIDs are safe for you. Always read the medicine label and follow directions.      Take your medicine as directed. Contact your healthcare provider if you think your medicine is not helping or if you have side effects. Tell him or her if you are allergic to any medicine. Keep a list of the medicines, vitamins, and herbs you take. Include the amounts, and when and why you take them. Bring the list or the pill bottles to follow-up visits. Carry your medicine list with you in case of an emergency.    Self-care: Concussion symptoms usually go away within about 10 days, but they may last longer. The following may be recommended to manage your symptoms:     Rest from physical and mental activities as directed. Mental activities are those that require thinking, concentration, and attention. You will need to rest until your symptoms are gone. Rest will allow you to recover from your concussion. Ask your healthcare provider when you can return to work and other daily activities.      Have someone stay with you for the first 24 hours after your injury. Your healthcare provider should be contacted if your symptoms get worse, or you develop new symptoms.      Do not participate in sports and physical activities until your healthcare provider says it is okay. They could make your symptoms worse or lead to another concussion. Your healthcare provider will tell you when it is okay for you to return to sports or physical activities. Ask for more information about sports concussions.    Prevent another concussion:     Wear protective sports equipment that fits properly. Helmets help decrease your risk for a serious brain injury. Talk to your healthcare provider about ways you can decrease your risk for a concussion if you play sports.      Wear your seatbelt every time you travel. This helps to decrease your risk for a head injury if you are in a car accident.     Follow up with your healthcare provider as directed: Write down your questions so you remember to ask them during your visits.     FOLLOW UP EVALUATION  To ensure optimal concussion recovery, follow up with a doctor specialized in concussion management. An evaluation by a specialty concussion program can ensure timely return to activities.    We offer appointments through the [facility] Concussion Program’s Hotline at [phone].

## 2019-04-20 NOTE — ED STATDOCS - ATTENDING CONTRIBUTION TO CARE
I, Jeffrey Hankins MD, personally saw the patient with ACP.  I have personally performed a face to face diagnostic evaluation on this patient.  I have reviewed the ACP note and agree with the history, exam, and plan of care, except as noted.
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
## 2019-05-01 ENCOUNTER — APPOINTMENT (OUTPATIENT)
Dept: NEUROSURGERY | Facility: CLINIC | Age: 39
End: 2019-05-01

## 2019-05-08 ENCOUNTER — APPOINTMENT (OUTPATIENT)
Dept: NEUROLOGY | Facility: CLINIC | Age: 39
End: 2019-05-08
Payer: MEDICAID

## 2019-05-08 VITALS
HEIGHT: 69 IN | SYSTOLIC BLOOD PRESSURE: 118 MMHG | WEIGHT: 260 LBS | BODY MASS INDEX: 38.51 KG/M2 | HEART RATE: 78 BPM | DIASTOLIC BLOOD PRESSURE: 78 MMHG

## 2019-05-08 DIAGNOSIS — F07.81 POSTCONCUSSIONAL SYNDROME: ICD-10-CM

## 2019-05-08 DIAGNOSIS — R51 HEADACHE: ICD-10-CM

## 2019-05-08 DIAGNOSIS — R42 DIZZINESS AND GIDDINESS: ICD-10-CM

## 2019-05-08 DIAGNOSIS — R53.83 OTHER FATIGUE: ICD-10-CM

## 2019-05-08 PROCEDURE — 99204 OFFICE O/P NEW MOD 45 MIN: CPT

## 2019-05-08 RX ORDER — ALBUTEROL SULFATE 2.5 MG/3ML
(2.5 MG/3ML) SOLUTION RESPIRATORY (INHALATION)
Qty: 150 | Refills: 0 | Status: DISCONTINUED | COMMUNITY
Start: 2018-07-04 | End: 2019-05-08

## 2019-05-08 RX ORDER — NEOMYCIN/POLYMYXIN B/PRAMOXINE 3.5-10K-1
CREAM (GRAM) TOPICAL
Refills: 0 | Status: DISCONTINUED | COMMUNITY
End: 2019-05-08

## 2019-05-08 RX ORDER — METRONIDAZOLE 7.5 MG/G
0.75 GEL VAGINAL
Refills: 0 | Status: DISCONTINUED | COMMUNITY
End: 2019-05-08

## 2019-05-08 RX ORDER — BECLOMETHASONE DIPROPIONATE HFA 80 UG/1
80 AEROSOL, METERED RESPIRATORY (INHALATION)
Qty: 106 | Refills: 0 | Status: DISCONTINUED | COMMUNITY
Start: 2018-07-04 | End: 2019-05-08

## 2019-05-08 NOTE — DATA REVIEWED
[de-identified] : CT brain 4/20/19: No acute intracranial hemorrhage, mass efect, or midline shift.
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
## 2019-05-08 NOTE — REVIEW OF SYSTEMS
[As Noted in HPI] : as noted in HPI [Anxiety] : anxiety [Depression] : depression [Eyesight Problems] : eyesight problems [Negative] : Musculoskeletal [de-identified] : dizziness, facial pain, imbalance, impaired memory, difficulty speaking [de-identified] : difficulty concentrating, irritabilities, stressed out [FreeTextEntry4] : sinus condition, tinnitus [FreeTextEntry7] : nausea, loss of appetite

## 2019-05-08 NOTE — CONSULT LETTER
[Dear  ___] : Dear  [unfilled], [Consult Letter:] : I had the pleasure of evaluating your patient, [unfilled]. [Please see my note below.] : Please see my note below. [FreeTextEntry2] : Imelda Hoover [Consult Closing:] : Thank you very much for allowing me to participate in the care of this patient.  If you have any questions, please do not hesitate to contact me. [FreeTextEntry3] : Sincerely,\par \par \par Miriam Eastman MD\par Diplomate, American Academy of Psychiatry and Neurology\par Board Certified in the Subspecialty of Clinical Neurophysiology\par Board Certified in the Subspecialty of Sleep Medicine\par Board Certified in the Subspecialty of Epilepsy\par

## 2019-05-17 ENCOUNTER — FORM ENCOUNTER (OUTPATIENT)
Age: 39
End: 2019-05-17

## 2019-05-18 ENCOUNTER — OUTPATIENT (OUTPATIENT)
Dept: OUTPATIENT SERVICES | Facility: HOSPITAL | Age: 39
LOS: 1 days | End: 2019-05-18
Payer: MEDICAID

## 2019-05-18 ENCOUNTER — APPOINTMENT (OUTPATIENT)
Dept: MRI IMAGING | Facility: CLINIC | Age: 39
End: 2019-05-18
Payer: MEDICAID

## 2019-05-18 DIAGNOSIS — Z90.89 ACQUIRED ABSENCE OF OTHER ORGANS: Chronic | ICD-10-CM

## 2019-05-18 DIAGNOSIS — F07.81 POSTCONCUSSIONAL SYNDROME: ICD-10-CM

## 2019-05-18 DIAGNOSIS — Z98.1 ARTHRODESIS STATUS: Chronic | ICD-10-CM

## 2019-05-18 PROCEDURE — 70551 MRI BRAIN STEM W/O DYE: CPT | Mod: 26

## 2019-05-18 PROCEDURE — 70551 MRI BRAIN STEM W/O DYE: CPT

## 2019-05-20 ENCOUNTER — APPOINTMENT (OUTPATIENT)
Dept: OBGYN | Facility: CLINIC | Age: 39
End: 2019-05-20
Payer: MEDICAID

## 2019-05-20 VITALS
HEIGHT: 69 IN | SYSTOLIC BLOOD PRESSURE: 118 MMHG | BODY MASS INDEX: 39.45 KG/M2 | WEIGHT: 266.38 LBS | DIASTOLIC BLOOD PRESSURE: 74 MMHG

## 2019-05-20 DIAGNOSIS — Z01.419 ENCOUNTER FOR GYNECOLOGICAL EXAMINATION (GENERAL) (ROUTINE) W/OUT ABNORMAL FINDINGS: ICD-10-CM

## 2019-05-20 LAB
BILIRUB UR QL STRIP: NORMAL
CLARITY UR: CLEAR
COLLECTION METHOD: NORMAL
GLUCOSE UR-MCNC: NORMAL
HCG UR QL: 0.2 EU/DL
HGB UR QL STRIP.AUTO: NORMAL
KETONES UR-MCNC: NORMAL
LEUKOCYTE ESTERASE UR QL STRIP: NORMAL
NITRITE UR QL STRIP: NORMAL
PH UR STRIP: 6
PROT UR STRIP-MCNC: NORMAL
SP GR UR STRIP: 1

## 2019-05-20 PROCEDURE — 81003 URINALYSIS AUTO W/O SCOPE: CPT | Mod: QW

## 2019-05-20 PROCEDURE — 99395 PREV VISIT EST AGE 18-39: CPT

## 2019-05-22 LAB — HPV HIGH+LOW RISK DNA PNL CVX: NOT DETECTED

## 2019-05-22 NOTE — HISTORY OF PRESENT ILLNESS
[1 Year Ago] : 1 year ago [Good] : being in good health [Last Pap ___] : Last cervical pap smear was [unfilled] [Reproductive Age] : is of reproductive age [Definite:  ___ (Date)] : the last menstrual period was [unfilled] [Menarche Age: ____] : age at menarche was [unfilled] [Sexually Active] : is sexually active [Natural Family Planning] : uses natural family planning [Male ___] : [unfilled] male [Menstrual Problems] : reports normal menses [Pregnancy History] : denies prior pregnancies [de-identified] : Pelvic U/S - 04/05/2016 [Monogamous] : is not monogamous [Contraception] : does not use contraception

## 2019-05-25 LAB — CYTOLOGY CVX/VAG DOC THIN PREP: NORMAL

## 2019-08-13 ENCOUNTER — APPOINTMENT (OUTPATIENT)
Dept: ORTHOPEDIC SURGERY | Facility: CLINIC | Age: 39
End: 2019-08-13
Payer: MEDICAID

## 2019-08-13 VITALS
WEIGHT: 260 LBS | DIASTOLIC BLOOD PRESSURE: 82 MMHG | HEART RATE: 67 BPM | SYSTOLIC BLOOD PRESSURE: 129 MMHG | HEIGHT: 69 IN | BODY MASS INDEX: 38.51 KG/M2

## 2019-08-13 PROCEDURE — 73630 X-RAY EXAM OF FOOT: CPT | Mod: LT

## 2019-08-13 PROCEDURE — 99214 OFFICE O/P EST MOD 30 MIN: CPT

## 2019-08-13 PROCEDURE — 73600 X-RAY EXAM OF ANKLE: CPT | Mod: LT

## 2019-09-22 ENCOUNTER — TRANSCRIPTION ENCOUNTER (OUTPATIENT)
Age: 39
End: 2019-09-22

## 2019-09-23 ENCOUNTER — RESULT CHARGE (OUTPATIENT)
Age: 39
End: 2019-09-23

## 2019-09-23 ENCOUNTER — APPOINTMENT (OUTPATIENT)
Dept: INTERNAL MEDICINE | Facility: CLINIC | Age: 39
End: 2019-09-23
Payer: MEDICAID

## 2019-09-23 VITALS
HEART RATE: 74 BPM | TEMPERATURE: 98.9 F | OXYGEN SATURATION: 97 % | WEIGHT: 264 LBS | SYSTOLIC BLOOD PRESSURE: 120 MMHG | HEIGHT: 69 IN | BODY MASS INDEX: 39.1 KG/M2 | DIASTOLIC BLOOD PRESSURE: 90 MMHG | RESPIRATION RATE: 14 BRPM

## 2019-09-23 DIAGNOSIS — R52 PAIN, UNSPECIFIED: ICD-10-CM

## 2019-09-23 DIAGNOSIS — Z87.09 PERSONAL HISTORY OF OTHER DISEASES OF THE RESPIRATORY SYSTEM: ICD-10-CM

## 2019-09-23 LAB
FLUAV SPEC QL CULT: NORMAL
FLUBV AG SPEC QL IA: NORMAL
S PYO AG SPEC QL IA: NORMAL

## 2019-09-23 PROCEDURE — 36415 COLL VENOUS BLD VENIPUNCTURE: CPT

## 2019-09-23 PROCEDURE — 87804 INFLUENZA ASSAY W/OPTIC: CPT | Mod: QW

## 2019-09-23 PROCEDURE — 99214 OFFICE O/P EST MOD 30 MIN: CPT | Mod: 25

## 2019-09-23 RX ORDER — FLUOXETINE HYDROCHLORIDE 10 MG/1
10 CAPSULE ORAL
Qty: 30 | Refills: 0 | Status: DISCONTINUED | COMMUNITY
Start: 2019-04-18

## 2019-09-23 NOTE — HISTORY OF PRESENT ILLNESS
[FreeTextEntry8] : Pt is c/o sore throat, cough for 1 week. No fever.\par Feels tired with body aches.

## 2019-09-23 NOTE — PHYSICAL EXAM
[No Acute Distress] : no acute distress [Well Nourished] : well nourished [Well Developed] : well developed [Well-Appearing] : well-appearing [Normal Sclera/Conjunctiva] : normal sclera/conjunctiva [PERRL] : pupils equal round and reactive to light [EOMI] : extraocular movements intact [Normal Outer Ear/Nose] : the outer ears and nose were normal in appearance [Normal Oropharynx] : the oropharynx was normal [No Lymphadenopathy] : no lymphadenopathy [No JVD] : no jugular venous distention [Supple] : supple [Thyroid Normal, No Nodules] : the thyroid was normal and there were no nodules present [No Accessory Muscle Use] : no accessory muscle use [No Respiratory Distress] : no respiratory distress  [Clear to Auscultation] : lungs were clear to auscultation bilaterally [Normal Rate] : normal rate  [Regular Rhythm] : with a regular rhythm [Normal S1, S2] : normal S1 and S2 [No Murmur] : no murmur heard [No Carotid Bruits] : no carotid bruits [No Abdominal Bruit] : a ~M bruit was not heard ~T in the abdomen [No Varicosities] : no varicosities [Pedal Pulses Present] : the pedal pulses are present [No Edema] : there was no peripheral edema [No Palpable Aorta] : no palpable aorta [No Extremity Clubbing/Cyanosis] : no extremity clubbing/cyanosis [Soft] : abdomen soft [Non Tender] : non-tender [No Masses] : no abdominal mass palpated [Non-distended] : non-distended [Normal Bowel Sounds] : normal bowel sounds [No HSM] : no HSM [No CVA Tenderness] : no CVA  tenderness [Normal Posterior Cervical Nodes] : no posterior cervical lymphadenopathy [Normal Anterior Cervical Nodes] : no anterior cervical lymphadenopathy [No Joint Swelling] : no joint swelling [No Spinal Tenderness] : no spinal tenderness [Grossly Normal Strength/Tone] : grossly normal strength/tone [Coordination Grossly Intact] : coordination grossly intact [No Rash] : no rash [No Focal Deficits] : no focal deficits [Deep Tendon Reflexes (DTR)] : deep tendon reflexes were 2+ and symmetric [Normal Gait] : normal gait [Normal Affect] : the affect was normal [Normal Insight/Judgement] : insight and judgment were intact

## 2019-09-26 ENCOUNTER — APPOINTMENT (OUTPATIENT)
Dept: ORTHOPEDIC SURGERY | Facility: CLINIC | Age: 39
End: 2019-09-26
Payer: MEDICAID

## 2019-09-26 PROCEDURE — 20606 DRAIN/INJ JOINT/BURSA W/US: CPT | Mod: LT

## 2019-09-26 PROCEDURE — 99204 OFFICE O/P NEW MOD 45 MIN: CPT | Mod: 25

## 2019-09-26 NOTE — PROCEDURE
[de-identified] : Procedure:  Ultrasound Guided cortisone injection of the Left subtalar joint\par Indication for ultrasound guidance: Ensure placement within the joint, and avoidance of superficial neurovasculature and musculotendinous structures\par Indication for injection: Osteoarthritis\par \par Utlizing the [employer]er II Cardio control portable ultrasound machine, the Linear 25mm 10-5 MHz transducer, sterile probe cover and sterile ultrasound gel, ultrasound guidance with the probe in the longitudinal axis, utilizing an out of plane approach was used for the injection.  A discussion was had with the patient regarding this procedure and all questions were answered. All risks, benefits and alternatives were discussed. These included but were not limited to bleeding, infection, and allergic reaction. A timeout was done to ensure correct side and pt agreed to the procedure.  Alcohol was used to clean the skin, and betadine was used to sterilize and prep the area in the anterolateral foot overlying the subtalar joint. Ethyl chloride spray was then used as a topical anesthetic. A 25-gauge needle was used to inject 2cc of 1% lidocaine without epinephrine and 1cc of 40mg/ml methylprednisolone into the subtalar joint. A sterile bandage was then applied. The patient tolerated the procedure well.

## 2019-09-26 NOTE — PHYSICAL EXAM
[de-identified] : Constitutional: Well-nourished, well-developed, No acute distress\par Respiratory:  Good respiratory effort, no SOB\par Lymphatic: No regional lymphadenopathy, no lymphedema\par Psychiatric: Pleasant and normal affect, alert and oriented x3\par Skin: Clean dry and intact B/L UE/LE\par Musculoskeletal: normal except where as noted in regional exam\par \par \par Right Ankle:\par APPEARANCE: no marked deformities, no swelling or malalignment\par POSITIVE TENDERNESS: none\par NONTENDER: medial malleolus, lateral malleolus, tibialis posterior tendon, achilles tendon, no marked thickening of tendon, ATFL, CFL, PTFL, anterior tibiofibular ligament (high ankle), sinus tarsus, deltoid ligaments, 5th metatarsal. \par RANGE OF MOTION: full & painless. \par RESISTIVE TESTING: painless resisted dorsiflexion, plantar flexion, inversion & eversion. \par SPECIAL TESTS: neg anterior drawer. neg talar tilt. neg Kleiger's\par NEURO: Normal sensation of LE, DTRs 2+/4 patella and achilles\par PULSES: 2+ DP/PT pulses\par B/L Hips: No asymmetry, malalignment, or swelling, Full ROM, 5/5 strength in flexion/ext, IR/ER, Abd/Add, Joints stable\par B/L Knees: No asymmetry, malalignment, or swelling, Full ROM, 5/5 strength in Flex/Ext, Joints stable\par BIOMECHANICAL EXAM: no marked leg length discrepancy, moderate hip abductor weakness b/l, no marked foot pronation, tight hams and ITB b/l.  Normal gait and station\par \par Left Ankle:\par APPEARANCE: Mild swelling, no marked deformities or malalignment\par POSITIVE TENDERNESS: + Subtalar joint\par NONTENDER: medial malleolus, lateral malleolus, tibialis posterior tendon, achilles tendon, no marked thickening of tendon, ATFL, CFL, PTFL, anterior tibiofibular ligament (high ankle), deltoid ligaments, 5th metatarsal. \par RANGE OF MOTION: Mildly limited dorsiflexion and plantar flexion due to pain, normal inversion/uterine. \par RESISTIVE TESTING: painless resisted dorsiflexion, plantar flexion, inversion & eversion. \par SPECIAL TESTS: neg anterior drawer. neg talar tilt. neg Kleiger's\par NEURO: Normal sensation of LE, DTRs 2+/4 patella and achilles\par PULSES: 2+ DP/PT pulses\par

## 2019-09-26 NOTE — HISTORY OF PRESENT ILLNESS
[de-identified] : The patient is here today for continued management of chronic ankle pain. Patient was evaluated by my associate Dr. Torres and determined to have subtalar impingement. He advised ultrasound guided cortisone injection for diagnostic and therapeutic purposes and patient like to proceed with injection at this time. No significant interval change since last evaluation.

## 2019-09-26 NOTE — DISCUSSION/SUMMARY
[de-identified] : Discussed findings of today's exam and possible causes of patient's pain.  Educated patient on their most probable diagnosis of Left subtalar joint pain.  Patient elected to proceed with a diagnostic/therapeutic ultrasound guided steroid injection today (see procedure note).  Patient advised to make note of whether their pain is improved starting in the next few minutes until 4-6 hours while the lidocaine numbs the interior of the subtalar joint; this is the diagnostic part of the injection. If pain is relieved immediately that helps us determine that the etiology of the pain is coming from within the subtalar joint. The steroid injected into the joint today will start to have an effect in the coming days, will be most effective in the next 1-2 weeks, and may last 1-2 months; that is the therapeutic portion of this injection. \par Re-examination of the involved ankle after the injection revealed noted improvement.  The patient should follow up with Dr. Torres, in 1-2 months for further management.  Patient appreciates and agrees with current plan.\par \par This note was generated using dragon medical dictation software. A reasonable effort has been made for proofreading its contents, but typos may still remain. If there are any questions or points of clarification needed please notify my office.

## 2019-10-14 ENCOUNTER — APPOINTMENT (OUTPATIENT)
Dept: INTERNAL MEDICINE | Facility: CLINIC | Age: 39
End: 2019-10-14

## 2019-10-15 ENCOUNTER — TRANSCRIPTION ENCOUNTER (OUTPATIENT)
Age: 39
End: 2019-10-15

## 2019-10-31 ENCOUNTER — APPOINTMENT (OUTPATIENT)
Dept: OBGYN | Facility: CLINIC | Age: 39
End: 2019-10-31
Payer: MEDICAID

## 2019-10-31 VITALS
BODY MASS INDEX: 39.25 KG/M2 | DIASTOLIC BLOOD PRESSURE: 82 MMHG | WEIGHT: 265 LBS | HEIGHT: 69 IN | SYSTOLIC BLOOD PRESSURE: 124 MMHG

## 2019-10-31 DIAGNOSIS — Z87.09 PERSONAL HISTORY OF OTHER DISEASES OF THE RESPIRATORY SYSTEM: ICD-10-CM

## 2019-10-31 DIAGNOSIS — M54.2 CERVICALGIA: ICD-10-CM

## 2019-10-31 DIAGNOSIS — R21 RASH AND OTHER NONSPECIFIC SKIN ERUPTION: ICD-10-CM

## 2019-10-31 DIAGNOSIS — Z01.818 ENCOUNTER FOR OTHER PREPROCEDURAL EXAMINATION: ICD-10-CM

## 2019-10-31 DIAGNOSIS — M54.5 LOW BACK PAIN: ICD-10-CM

## 2019-10-31 DIAGNOSIS — Z87.898 PERSONAL HISTORY OF OTHER SPECIFIED CONDITIONS: ICD-10-CM

## 2019-10-31 DIAGNOSIS — N90.89 OTHER SPECIFIED NONINFLAMMATORY DISORDERS OF VULVA AND PERINEUM: ICD-10-CM

## 2019-10-31 DIAGNOSIS — M70.62 TROCHANTERIC BURSITIS, LEFT HIP: ICD-10-CM

## 2019-10-31 DIAGNOSIS — M25.572 PAIN IN LEFT ANKLE AND JOINTS OF LEFT FOOT: ICD-10-CM

## 2019-10-31 PROCEDURE — 99213 OFFICE O/P EST LOW 20 MIN: CPT

## 2019-11-02 PROBLEM — M54.5 ACUTE LEFT-SIDED LOW BACK PAIN WITHOUT SCIATICA: Status: RESOLVED | Noted: 2017-08-02 | Resolved: 2019-11-02

## 2019-11-02 PROBLEM — N90.89 LESION OF VULVA: Status: ACTIVE | Noted: 2019-11-02

## 2019-11-02 PROBLEM — M54.2 NECK PAIN ON LEFT SIDE: Status: RESOLVED | Noted: 2017-08-02 | Resolved: 2019-11-02

## 2019-11-02 PROBLEM — Z87.898 HISTORY OF PALPITATIONS: Status: RESOLVED | Noted: 2017-05-26 | Resolved: 2019-11-02

## 2019-11-02 PROBLEM — M70.62 TROCHANTERIC BURSITIS OF LEFT HIP: Status: RESOLVED | Noted: 2017-08-02 | Resolved: 2019-11-02

## 2019-11-02 PROBLEM — Z87.09 HISTORY OF ACUTE BRONCHITIS: Status: RESOLVED | Noted: 2017-01-23 | Resolved: 2019-11-02

## 2019-11-02 NOTE — PHYSICAL EXAM
[Labia Minora Erythema Right] : erythema of the right labia minora [Normal] : external genitalia [Labia Majora] : labia major [Labia Minora] : left labia minora [de-identified] : right inner labia minora, small resolving sebaceous cyst - draining serosanguineous fluid

## 2019-11-02 NOTE — DISCUSSION/SUMMARY
[FreeTextEntry1] : Pt aware right vulva cyst is resolving.  Pt instructed to continue with warm soaks.  Pt to RTO if cyst not completely resolved in 7-10 days, call parameters reviewed.  All the pt's questions and concerns were addressed.

## 2019-11-02 NOTE — HISTORY OF PRESENT ILLNESS
[Male ___] : [unfilled] male [Definite:  ___ (Date)] : the last menstrual period was [unfilled] [Menarche Age: ____] : age at menarche was [unfilled] [Good] : being in good health [Healthy Diet] : a healthy diet [Last Pap ___] : Last cervical pap smear was [unfilled] [Reproductive Age] : is of reproductive age [Sexually Active] : is sexually active [Menstrual Problems] : reports normal menses

## 2019-11-11 ENCOUNTER — TRANSCRIPTION ENCOUNTER (OUTPATIENT)
Age: 39
End: 2019-11-11

## 2019-11-12 ENCOUNTER — APPOINTMENT (OUTPATIENT)
Dept: ORTHOPEDIC SURGERY | Facility: CLINIC | Age: 39
End: 2019-11-12
Payer: MEDICAID

## 2019-11-12 DIAGNOSIS — R20.0 ANESTHESIA OF SKIN: ICD-10-CM

## 2019-11-12 PROCEDURE — 99213 OFFICE O/P EST LOW 20 MIN: CPT

## 2019-11-14 ENCOUNTER — APPOINTMENT (OUTPATIENT)
Dept: INTERNAL MEDICINE | Facility: CLINIC | Age: 39
End: 2019-11-14
Payer: MEDICAID

## 2019-11-14 VITALS
DIASTOLIC BLOOD PRESSURE: 90 MMHG | HEART RATE: 92 BPM | BODY MASS INDEX: 39.1 KG/M2 | RESPIRATION RATE: 14 BRPM | WEIGHT: 264 LBS | HEIGHT: 69 IN | TEMPERATURE: 97.4 F | SYSTOLIC BLOOD PRESSURE: 120 MMHG | OXYGEN SATURATION: 98 %

## 2019-11-14 PROCEDURE — 99214 OFFICE O/P EST MOD 30 MIN: CPT

## 2019-11-14 RX ORDER — AZITHROMYCIN 250 MG/1
250 TABLET, FILM COATED ORAL
Qty: 1 | Refills: 0 | Status: DISCONTINUED | COMMUNITY
Start: 2019-09-23 | End: 2019-11-14

## 2019-11-14 RX ORDER — MONTELUKAST SODIUM 10 MG/1
TABLET, FILM COATED ORAL
Refills: 0 | Status: DISCONTINUED | COMMUNITY
End: 2019-11-14

## 2019-11-14 RX ORDER — PREDNISONE 50 MG/1
50 TABLET ORAL
Qty: 5 | Refills: 0 | Status: DISCONTINUED | COMMUNITY
Start: 2019-10-15 | End: 2019-11-14

## 2019-11-14 RX ORDER — FEXOFENADINE HCL 180 MG
180 TABLET ORAL
Refills: 0 | Status: ACTIVE | COMMUNITY

## 2019-11-14 RX ORDER — DOXYCYCLINE HYCLATE 100 MG/1
100 CAPSULE ORAL
Qty: 20 | Refills: 0 | Status: DISCONTINUED | COMMUNITY
Start: 2019-10-15 | End: 2019-11-14

## 2019-11-14 NOTE — PHYSICAL EXAM
[No Acute Distress] : no acute distress [Well Nourished] : well nourished [Well Developed] : well developed [Well-Appearing] : well-appearing [Normal Sclera/Conjunctiva] : normal sclera/conjunctiva [PERRL] : pupils equal round and reactive to light [EOMI] : extraocular movements intact [Normal Outer Ear/Nose] : the outer ears and nose were normal in appearance [Normal Oropharynx] : the oropharynx was normal [No JVD] : no jugular venous distention [No Lymphadenopathy] : no lymphadenopathy [Supple] : supple [Thyroid Normal, No Nodules] : the thyroid was normal and there were no nodules present [No Accessory Muscle Use] : no accessory muscle use [No Respiratory Distress] : no respiratory distress  [Clear to Auscultation] : lungs were clear to auscultation bilaterally [Normal Rate] : normal rate  [Regular Rhythm] : with a regular rhythm [No Murmur] : no murmur heard [Normal S1, S2] : normal S1 and S2 [No Abdominal Bruit] : a ~M bruit was not heard ~T in the abdomen [No Carotid Bruits] : no carotid bruits [No Varicosities] : no varicosities [Pedal Pulses Present] : the pedal pulses are present [No Edema] : there was no peripheral edema [No Palpable Aorta] : no palpable aorta [Soft] : abdomen soft [No Extremity Clubbing/Cyanosis] : no extremity clubbing/cyanosis [Non Tender] : non-tender [Non-distended] : non-distended [No Masses] : no abdominal mass palpated [No HSM] : no HSM [Normal Bowel Sounds] : normal bowel sounds [Normal Anterior Cervical Nodes] : no anterior cervical lymphadenopathy [Normal Posterior Cervical Nodes] : no posterior cervical lymphadenopathy [No CVA Tenderness] : no CVA  tenderness [No Spinal Tenderness] : no spinal tenderness [No Joint Swelling] : no joint swelling [Grossly Normal Strength/Tone] : grossly normal strength/tone [No Rash] : no rash [Coordination Grossly Intact] : coordination grossly intact [No Focal Deficits] : no focal deficits [Normal Gait] : normal gait [Normal Affect] : the affect was normal [Normal Insight/Judgement] : insight and judgment were intact [Deep Tendon Reflexes (DTR)] : deep tendon reflexes were 2+ and symmetric

## 2019-11-16 PROBLEM — R20.0 NUMBNESS OF FOOT: Status: ACTIVE | Noted: 2019-08-25

## 2020-02-10 ENCOUNTER — APPOINTMENT (OUTPATIENT)
Dept: ORTHOPEDIC SURGERY | Facility: CLINIC | Age: 40
End: 2020-02-10
Payer: MEDICAID

## 2020-02-24 ENCOUNTER — APPOINTMENT (OUTPATIENT)
Dept: ORTHOPEDIC SURGERY | Facility: CLINIC | Age: 40
End: 2020-02-24
Payer: MEDICAID

## 2020-02-24 VITALS — HEIGHT: 69 IN | BODY MASS INDEX: 39.25 KG/M2 | HEART RATE: 73 BPM | WEIGHT: 265 LBS

## 2020-02-24 PROCEDURE — 99213 OFFICE O/P EST LOW 20 MIN: CPT

## 2020-03-12 ENCOUNTER — APPOINTMENT (OUTPATIENT)
Dept: ORTHOPEDIC SURGERY | Facility: CLINIC | Age: 40
End: 2020-03-12

## 2020-03-25 DIAGNOSIS — B02.9 ZOSTER W/OUT COMPLICATIONS: ICD-10-CM

## 2020-05-26 ENCOUNTER — APPOINTMENT (OUTPATIENT)
Dept: ORTHOPEDIC SURGERY | Facility: CLINIC | Age: 40
End: 2020-05-26

## 2020-05-29 ENCOUNTER — APPOINTMENT (OUTPATIENT)
Dept: ORTHOPEDIC SURGERY | Facility: CLINIC | Age: 40
End: 2020-05-29
Payer: MEDICAID

## 2020-05-29 PROCEDURE — 99212 OFFICE O/P EST SF 10 MIN: CPT | Mod: 95

## 2020-05-29 NOTE — DISCUSSION/SUMMARY
[de-identified] : Options reviewed, continue NB shoe wear with Arizona brace and begin weaning protocol in 3 months, activity modification, HEP.  Return to office in 3 - 4 months / PRN, all questions answered.

## 2020-05-29 NOTE — HISTORY OF PRESENT ILLNESS
[Home] : at home, [unfilled] , at the time of the visit. [Other Location: e.g. Home (Enter Location, City,State)___] : at [unfilled] [Verbal consent obtained from patient] : the patient, [unfilled] [FreeTextEntry1] : Follow-up L pes planus with lateral hindfoot impaction, reports doing well wearing Arizona brace custom orthotic, and increased ambulatory activity.

## 2020-08-17 DIAGNOSIS — M25.579 PAIN IN UNSPECIFIED ANKLE AND JOINTS OF UNSPECIFIED FOOT: ICD-10-CM

## 2020-09-19 ENCOUNTER — APPOINTMENT (OUTPATIENT)
Dept: ORTHOPEDIC SURGERY | Facility: CLINIC | Age: 40
End: 2020-09-19

## 2020-09-26 ENCOUNTER — APPOINTMENT (OUTPATIENT)
Dept: ORTHOPEDIC SURGERY | Facility: CLINIC | Age: 40
End: 2020-09-26
Payer: MEDICAID

## 2020-09-26 DIAGNOSIS — M21.6X2 OTHER ACQUIRED DEFORMITIES OF LEFT FOOT: ICD-10-CM

## 2020-09-26 DIAGNOSIS — Z98.890 OTHER SPECIFIED POSTPROCEDURAL STATES: ICD-10-CM

## 2020-09-26 DIAGNOSIS — M25.872 OTHER SPECIFIED JOINT DISORDERS, LEFT ANKLE AND FOOT: ICD-10-CM

## 2020-09-26 DIAGNOSIS — M21.42 FLAT FOOT [PES PLANUS] (ACQUIRED), LEFT FOOT: ICD-10-CM

## 2020-09-26 PROCEDURE — 99213 OFFICE O/P EST LOW 20 MIN: CPT

## 2020-09-26 NOTE — DISCUSSION/SUMMARY
[de-identified] : Options reviewed, NB shoe wear, activity modification, HEP, full-length custom viscoelastic orthotics.  Return to office in 4 - 6 months / PRN, all questions answered.

## 2020-09-26 NOTE — HISTORY OF PRESENT ILLNESS
[Sneakers] : veronika [FreeTextEntry1] : 39 year female returns for f/u of chronic L ankle pain x 2003, reports significant amelioration since last visit, has weaned from Arizona brace custom orthotic.  Pt reports progressing well since last visit. Denies additional musculoskeletal complaints referable to foot/ankle. Pt denies any new symptoms/injuries to L foot/ankle. BMI 39.

## 2020-09-26 NOTE — PHYSICAL EXAM
[Normal] : Oriented to person, place, and time, insight and judgement were intact and the affect was normal [de-identified] : Extremity: +Equinus (releases) L LE, +PPAV L foot, +lateral impaction L hindfoot / decreased minimal residual tenderness, able to perform L SLHR testing, hypoesthesias lateral aspect L foot unchanged. Nontender L peroneals, syndesmosis, Achilles, medial malleolus, Deltoid, midfoot LF and PTT insertional, and forefoot / base 5th metatarsal. Stable Drawer testing L ankle, 5 / 5 evertor strength L ankle, calves soft and nontender, sensorimotor as aforementioned, skin intact (incisions well healed) L LE. AOx3, mood / affect normal.  [de-identified] : KEYA, NAD

## 2020-10-09 ENCOUNTER — TRANSCRIPTION ENCOUNTER (OUTPATIENT)
Age: 40
End: 2020-10-09

## 2020-10-13 ENCOUNTER — APPOINTMENT (OUTPATIENT)
Dept: INTERNAL MEDICINE | Facility: CLINIC | Age: 40
End: 2020-10-13
Payer: MEDICAID

## 2020-10-13 ENCOUNTER — NON-APPOINTMENT (OUTPATIENT)
Age: 40
End: 2020-10-13

## 2020-10-13 PROCEDURE — 99213 OFFICE O/P EST LOW 20 MIN: CPT | Mod: 95

## 2020-10-13 NOTE — REVIEW OF SYSTEMS
[Fatigue] : fatigue [Nasal Discharge] : nasal discharge [Sore Throat] : sore throat [Postnasal Drip] : postnasal drip [Cough] : cough [Negative] : Genitourinary

## 2020-10-13 NOTE — PHYSICAL EXAM
[No Respiratory Distress] : no respiratory distress  [Normal] : soft, non-tender, non-distended, no masses palpated, no HSM and normal bowel sounds [de-identified] : sinus pressure

## 2020-10-13 NOTE — HISTORY OF PRESENT ILLNESS
[Home] : at home, [unfilled] , at the time of the visit. [Medical Office: ([facility])___] : at the medical office located in  [Verbal consent obtained from patient] : the patient, [unfilled] [FreeTextEntry8] : Pt with sinus pressure, PN and ear pain\par Symptoms for one week.\par No temps\par Tested neg COVID and strep

## 2020-10-13 NOTE — ASSESSMENT
[FreeTextEntry1] : acute sinus- Flonase Ns. and clarithromycin 500mg bid. Return if worse or persist.\par Time spent 15 minutes

## 2020-12-21 PROBLEM — Z01.419 ENCOUNTER FOR GYNECOLOGICAL EXAMINATION WITHOUT ABNORMAL FINDING: Status: RESOLVED | Noted: 2019-05-20 | Resolved: 2020-12-21

## 2020-12-21 PROBLEM — Z87.09 HISTORY OF SORE THROAT: Status: RESOLVED | Noted: 2019-09-23 | Resolved: 2020-12-21

## 2021-06-27 ENCOUNTER — TRANSCRIPTION ENCOUNTER (OUTPATIENT)
Age: 41
End: 2021-06-27

## 2022-01-25 ENCOUNTER — APPOINTMENT (OUTPATIENT)
Dept: INTERNAL MEDICINE | Facility: CLINIC | Age: 42
End: 2022-01-25
Payer: MEDICAID

## 2022-01-25 PROCEDURE — 99213 OFFICE O/P EST LOW 20 MIN: CPT | Mod: 95

## 2022-01-25 RX ORDER — FLUTICASONE PROPIONATE 50 UG/1
50 SPRAY, METERED NASAL DAILY
Qty: 1 | Refills: 3 | Status: ACTIVE | COMMUNITY
Start: 2022-01-25 | End: 1900-01-01

## 2022-01-25 NOTE — HISTORY OF PRESENT ILLNESS
[FreeTextEntry8] : Pt consents to this video consultation.\par Pt c/o URI sx for 2 weeks.\par Has tested negative for COVID twice.\par Now has bilateral maxillary sinus pressure for 2 days

## 2022-02-10 ENCOUNTER — TRANSCRIPTION ENCOUNTER (OUTPATIENT)
Age: 42
End: 2022-02-10

## 2022-02-14 ENCOUNTER — APPOINTMENT (OUTPATIENT)
Dept: INTERNAL MEDICINE | Facility: CLINIC | Age: 42
End: 2022-02-14
Payer: MEDICAID

## 2022-02-14 VITALS
WEIGHT: 255 LBS | HEART RATE: 103 BPM | DIASTOLIC BLOOD PRESSURE: 78 MMHG | BODY MASS INDEX: 37.77 KG/M2 | RESPIRATION RATE: 14 BRPM | OXYGEN SATURATION: 98 % | HEIGHT: 69 IN | TEMPERATURE: 98.3 F | SYSTOLIC BLOOD PRESSURE: 128 MMHG

## 2022-02-14 DIAGNOSIS — R58 HEMORRHAGE, NOT ELSEWHERE CLASSIFIED: ICD-10-CM

## 2022-02-14 DIAGNOSIS — J06.9 ACUTE UPPER RESPIRATORY INFECTION, UNSPECIFIED: ICD-10-CM

## 2022-02-14 PROCEDURE — 99213 OFFICE O/P EST LOW 20 MIN: CPT

## 2022-02-14 NOTE — HISTORY OF PRESENT ILLNESS
[FreeTextEntry8] : Pt states that mid January she started to get purple and red toes L foot > R. Around this time she was exposed to covid, but tested negative. Has been vaccinated and not boosted. Not taking any medications.

## 2022-02-14 NOTE — PHYSICAL EXAM
[No Edema] : there was no peripheral edema [Normal] : affect was normal and insight and judgment were intact [de-identified] : toes with cool feet and slight tenderness left 2nd toe.  [de-identified] : ecchymotic and cool toes L > R

## 2022-02-15 ENCOUNTER — LABORATORY RESULT (OUTPATIENT)
Age: 42
End: 2022-02-15

## 2022-02-16 LAB
25(OH)D3 SERPL-MCNC: 24.7 NG/ML
ALBUMIN SERPL ELPH-MCNC: 4.3 G/DL
ALP BLD-CCNC: 60 U/L
ALT SERPL-CCNC: 13 U/L
ANION GAP SERPL CALC-SCNC: 13 MMOL/L
AST SERPL-CCNC: 13 U/L
BASOPHILS # BLD AUTO: 0.05 K/UL
BASOPHILS NFR BLD AUTO: 0.8 %
BILIRUB SERPL-MCNC: 0.4 MG/DL
BUN SERPL-MCNC: 13 MG/DL
CALCIUM SERPL-MCNC: 9 MG/DL
CHLORIDE SERPL-SCNC: 105 MMOL/L
CHOLEST SERPL-MCNC: 212 MG/DL
CO2 SERPL-SCNC: 22 MMOL/L
COVID-19 NUCLEOCAPSID  GAM ANTIBODY INTERPRETATION: POSITIVE
COVID-19 SPIKE DOMAIN ANTIBODY INTERPRETATION: POSITIVE
CREAT SERPL-MCNC: 0.86 MG/DL
CRP SERPL-MCNC: <3 MG/L
EOSINOPHIL # BLD AUTO: 0.17 K/UL
EOSINOPHIL NFR BLD AUTO: 2.7 %
ERYTHROCYTE [SEDIMENTATION RATE] IN BLOOD BY WESTERGREN METHOD: 11 MM/HR
ESTIMATED AVERAGE GLUCOSE: 105 MG/DL
FOLATE SERPL-MCNC: 10.9 NG/ML
GLUCOSE SERPL-MCNC: 95 MG/DL
HBA1C MFR BLD HPLC: 5.3 %
HCT VFR BLD CALC: 42.1 %
HDLC SERPL-MCNC: 73 MG/DL
HGB BLD-MCNC: 13.4 G/DL
IMM GRANULOCYTES NFR BLD AUTO: 0.5 %
LDLC SERPL CALC-MCNC: 126 MG/DL
LYMPHOCYTES # BLD AUTO: 1.75 K/UL
LYMPHOCYTES NFR BLD AUTO: 27.3 %
MAN DIFF?: NORMAL
MCHC RBC-ENTMCNC: 29.8 PG
MCHC RBC-ENTMCNC: 31.8 GM/DL
MCV RBC AUTO: 93.6 FL
MONOCYTES # BLD AUTO: 0.48 K/UL
MONOCYTES NFR BLD AUTO: 7.5 %
NEUTROPHILS # BLD AUTO: 3.92 K/UL
NEUTROPHILS NFR BLD AUTO: 61.2 %
NONHDLC SERPL-MCNC: 139 MG/DL
PLATELET # BLD AUTO: 260 K/UL
POTASSIUM SERPL-SCNC: 4.2 MMOL/L
PROT SERPL-MCNC: 6.9 G/DL
RBC # BLD: 4.5 M/UL
RBC # FLD: 13.8 %
SARS-COV-2 AB SERPL IA-ACNC: >250 U/ML
SARS-COV-2 AB SERPL QL IA: 116 INDEX
SODIUM SERPL-SCNC: 139 MMOL/L
TRIGL SERPL-MCNC: 66 MG/DL
TSH SERPL-ACNC: 4.31 UIU/ML
VIT B12 SERPL-MCNC: 584 PG/ML
WBC # FLD AUTO: 6.4 K/UL

## 2022-04-01 LAB
T3FREE SERPL-MCNC: 2.94 PG/ML
T4 FREE SERPL-MCNC: 0.9 NG/DL
TSH SERPL-ACNC: 3 UIU/ML

## 2022-06-11 ENCOUNTER — NON-APPOINTMENT (OUTPATIENT)
Age: 42
End: 2022-06-11

## 2022-07-07 ENCOUNTER — APPOINTMENT (OUTPATIENT)
Dept: INTERNAL MEDICINE | Facility: CLINIC | Age: 42
End: 2022-07-07

## 2022-07-07 VITALS
SYSTOLIC BLOOD PRESSURE: 124 MMHG | WEIGHT: 270 LBS | HEART RATE: 90 BPM | BODY MASS INDEX: 39.99 KG/M2 | HEIGHT: 69 IN | RESPIRATION RATE: 14 BRPM | DIASTOLIC BLOOD PRESSURE: 80 MMHG | OXYGEN SATURATION: 98 %

## 2022-07-07 DIAGNOSIS — E78.5 HYPERLIPIDEMIA, UNSPECIFIED: ICD-10-CM

## 2022-07-07 DIAGNOSIS — M79.673 PAIN IN UNSPECIFIED FOOT: ICD-10-CM

## 2022-07-07 PROCEDURE — 99214 OFFICE O/P EST MOD 30 MIN: CPT

## 2022-07-07 RX ORDER — AZITHROMYCIN 250 MG/1
250 TABLET, FILM COATED ORAL
Qty: 1 | Refills: 0 | Status: DISCONTINUED | COMMUNITY
Start: 2022-01-25 | End: 2022-07-07

## 2022-07-22 ENCOUNTER — APPOINTMENT (OUTPATIENT)
Dept: ORTHOPEDIC SURGERY | Facility: CLINIC | Age: 42
End: 2022-07-22

## 2022-07-22 VITALS
SYSTOLIC BLOOD PRESSURE: 125 MMHG | WEIGHT: 270 LBS | BODY MASS INDEX: 39.99 KG/M2 | HEART RATE: 85 BPM | DIASTOLIC BLOOD PRESSURE: 80 MMHG | HEIGHT: 69 IN

## 2022-07-22 DIAGNOSIS — Z98.890 OTHER SPECIFIED POSTPROCEDURAL STATES: ICD-10-CM

## 2022-07-22 DIAGNOSIS — M25.571 PAIN IN RIGHT ANKLE AND JOINTS OF RIGHT FOOT: ICD-10-CM

## 2022-07-22 DIAGNOSIS — M21.41 FLAT FOOT [PES PLANUS] (ACQUIRED), RIGHT FOOT: ICD-10-CM

## 2022-07-22 DIAGNOSIS — M62.89 OTHER SPECIFIED DISORDERS OF MUSCLE: ICD-10-CM

## 2022-07-22 DIAGNOSIS — M21.42 FLAT FOOT [PES PLANUS] (ACQUIRED), RIGHT FOOT: ICD-10-CM

## 2022-07-22 DIAGNOSIS — M76.821 POSTERIOR TIBIAL TENDINITIS, RIGHT LEG: ICD-10-CM

## 2022-07-22 PROCEDURE — 99214 OFFICE O/P EST MOD 30 MIN: CPT

## 2022-07-22 PROCEDURE — 73610 X-RAY EXAM OF ANKLE: CPT | Mod: RT

## 2022-07-27 ENCOUNTER — NON-APPOINTMENT (OUTPATIENT)
Age: 42
End: 2022-07-27

## 2022-08-12 ENCOUNTER — APPOINTMENT (OUTPATIENT)
Dept: OBGYN | Facility: CLINIC | Age: 42
End: 2022-08-12

## 2022-08-12 VITALS
BODY MASS INDEX: 39.99 KG/M2 | HEIGHT: 69 IN | DIASTOLIC BLOOD PRESSURE: 78 MMHG | WEIGHT: 270 LBS | SYSTOLIC BLOOD PRESSURE: 110 MMHG

## 2022-08-12 DIAGNOSIS — N89.8 OTHER SPECIFIED NONINFLAMMATORY DISORDERS OF VAGINA: ICD-10-CM

## 2022-08-12 LAB
BILIRUB UR QL STRIP: NORMAL
CLARITY UR: CLEAR
GLUCOSE UR-MCNC: NORMAL
HCG UR QL: 0.2 EU/DL
HGB UR QL STRIP.AUTO: NORMAL
KETONES UR-MCNC: NORMAL
LEUKOCYTE ESTERASE UR QL STRIP: ABNORMAL
NITRITE UR QL STRIP: NORMAL
PH UR STRIP: 6
PROT UR STRIP-MCNC: NORMAL
SP GR UR STRIP: 1.02

## 2022-08-12 PROCEDURE — 81003 URINALYSIS AUTO W/O SCOPE: CPT | Mod: QW

## 2022-08-12 PROCEDURE — 99213 OFFICE O/P EST LOW 20 MIN: CPT | Mod: 25

## 2022-08-12 RX ORDER — CLARITHROMYCIN 500 MG/1
500 TABLET, FILM COATED ORAL TWICE DAILY
Qty: 20 | Refills: 0 | Status: DISCONTINUED | COMMUNITY
Start: 2019-11-14 | End: 2022-08-12

## 2022-08-12 RX ORDER — LEVOTHYROXINE SODIUM 0.05 MG/1
50 TABLET ORAL
Qty: 30 | Refills: 0 | Status: DISCONTINUED | COMMUNITY
Start: 2022-07-31

## 2022-08-12 NOTE — PLAN
[FreeTextEntry1] : -F/u BV panel\par -Rx for metrogel today, advised rx may change pending results; correct use reviewed\par -Advised to return for annual apt. once current sx's are resolved \par -Call or RTO PRN for any new problems, questions or concerns \par

## 2022-08-12 NOTE — PHYSICAL EXAM
[Chaperone Present] : A chaperone was present in the examining room during all aspects of the physical examination [Appropriately responsive] : appropriately responsive [Alert] : alert [No Acute Distress] : no acute distress [Soft] : soft [Oriented x3] : oriented x3 [Examination Of The Breasts] : a normal appearance [No Masses] : no breast masses were palpable [Labia Majora] : normal [Labia Minora] : normal [Labia Minora Erythema] : erythema [Normal] : normal [Uterine Adnexae] : non-palpable [FreeTextEntry1] : CECY padilla  [Tenderness] : nontender

## 2022-08-12 NOTE — HISTORY OF PRESENT ILLNESS
[HPV test offered] : HPV test offered [Y] : Patient is sexually active [N] : Patient denies prior pregnancies [Ultrasound] : ultrasound [Menarche Age: ____] : age at menarche was [unfilled] [Currently Active] : currently active [Men] : men [BoneDensityDate] : 05/20/2019 [TextBox_37] : Negative [HPVDate] : 05/20/2019 [TextBox_78] : Negative [LMPDate] : 08/06/2022 [TextBox_27] : Pevic: 01/09/2019 [FreeTextEntry1] : 08/06/2022

## 2022-08-15 LAB
CANDIDA VAG CYTO: NOT DETECTED
G VAGINALIS+PREV SP MTYP VAG QL MICRO: DETECTED
T VAGINALIS VAG QL WET PREP: NOT DETECTED

## 2022-09-17 ENCOUNTER — APPOINTMENT (OUTPATIENT)
Dept: OBGYN | Facility: CLINIC | Age: 42
End: 2022-09-17

## 2022-09-17 VITALS
HEIGHT: 69 IN | BODY MASS INDEX: 39.99 KG/M2 | SYSTOLIC BLOOD PRESSURE: 124 MMHG | WEIGHT: 270 LBS | DIASTOLIC BLOOD PRESSURE: 80 MMHG

## 2022-09-17 DIAGNOSIS — Z12.39 ENCOUNTER FOR OTHER SCREENING FOR MALIGNANT NEOPLASM OF BREAST: ICD-10-CM

## 2022-09-17 DIAGNOSIS — Z12.4 ENCOUNTER FOR SCREENING FOR MALIGNANT NEOPLASM OF CERVIX: ICD-10-CM

## 2022-09-17 DIAGNOSIS — Z87.891 PERSONAL HISTORY OF NICOTINE DEPENDENCE: ICD-10-CM

## 2022-09-17 DIAGNOSIS — Z01.419 ENCOUNTER FOR GYNECOLOGICAL EXAMINATION (GENERAL) (ROUTINE) W/OUT ABNORMAL FINDINGS: ICD-10-CM

## 2022-09-17 PROCEDURE — 99396 PREV VISIT EST AGE 40-64: CPT

## 2022-09-17 RX ORDER — FLUTICASONE PROPIONATE 50 UG/1
50 SPRAY, METERED NASAL DAILY
Qty: 1 | Refills: 1 | Status: DISCONTINUED | COMMUNITY
End: 2022-09-17

## 2022-09-17 RX ORDER — VALACYCLOVIR 1 G/1
1 TABLET, FILM COATED ORAL 3 TIMES DAILY
Qty: 21 | Refills: 0 | Status: DISCONTINUED | COMMUNITY
Start: 2020-03-25 | End: 2022-09-17

## 2022-09-17 RX ORDER — METRONIDAZOLE 7.5 MG/G
0.75 GEL VAGINAL
Qty: 1 | Refills: 0 | Status: DISCONTINUED | COMMUNITY
Start: 2022-08-12 | End: 2022-09-17

## 2022-09-17 NOTE — HISTORY OF PRESENT ILLNESS
[HPV test offered] : HPV test offered [Y] : Patient is sexually active [N] : Patient denies prior pregnancies [Menarche Age: ____] : age at menarche was [unfilled] [No] : Patient does not have concerns regarding sex [Currently Active] : currently active [Men] : men [TextBox_4] : Malina is here for her annual exam. she denies complaints.  She reports regular monthly menses.  \par \par  [PapSmeardate] : 5/20/19 [TextBox_31] : neg [HPVDate] : 05/20/19 [TextBox_78] : Negative [LMPDate] : 8/30/22 [PGHxTotal] : 0 [FreeTextEntry1] : 8/30/22

## 2022-09-17 NOTE — PHYSICAL EXAM
[Chaperone Present] : A chaperone was present in the examining room during all aspects of the physical examination [Appropriately responsive] : appropriately responsive [Alert] : alert [No Acute Distress] : no acute distress [Soft] : soft [Non-tender] : non-tender [Non-distended] : non-distended [No Lesions] : no lesions [No Mass] : no mass [Oriented x3] : oriented x3 [Examination Of The Breasts] : a normal appearance [No Masses] : no breast masses were palpable [Normal] : normal [Uterine Adnexae] : non-palpable [FreeTextEntry1] : CECY and NP student Disha [Tenderness] : nontender [Enlarged ___ wks] : not enlarged [Mass ___ cm] : no uterine mass was palpated

## 2022-09-17 NOTE — PLAN
[FreeTextEntry1] : - mammo rx provided, self breast exams encouraged\par - pap obtained\par - contraception options offered, pt declines, happy with monitoring cycles/timing for now.  she states if she were to become pregnant it would not be an issue.

## 2022-09-21 LAB — HPV HIGH+LOW RISK DNA PNL CVX: NOT DETECTED

## 2022-09-23 LAB — CYTOLOGY CVX/VAG DOC THIN PREP: NORMAL

## 2022-09-26 ENCOUNTER — APPOINTMENT (OUTPATIENT)
Dept: ENDOCRINOLOGY | Facility: CLINIC | Age: 42
End: 2022-09-26

## 2022-10-18 ENCOUNTER — APPOINTMENT (OUTPATIENT)
Dept: ENDOCRINOLOGY | Facility: CLINIC | Age: 42
End: 2022-10-18

## 2022-10-18 VITALS
OXYGEN SATURATION: 98 % | SYSTOLIC BLOOD PRESSURE: 122 MMHG | BODY MASS INDEX: 42.09 KG/M2 | HEART RATE: 63 BPM | DIASTOLIC BLOOD PRESSURE: 87 MMHG | WEIGHT: 285 LBS

## 2022-10-18 PROCEDURE — 99203 OFFICE O/P NEW LOW 30 MIN: CPT

## 2022-10-18 NOTE — HISTORY OF PRESENT ILLNESS
[FreeTextEntry1] : Ms. MANDO RICHARDSON is a 42 year old female  with a past medical history of hypothyroidism who presents for management of her thyroid disease. Patient was first diagnosed with thyroid disorder in 2/2022. She felt that she had symptoms for years. She had muscle pain, fatigue, depression, joint pain, fogginess. She was on levothyroxine 25 mcg and she felt better but then eventually the symptoms came back. She was then placed on 50 mcg but she had palpations and anxiety.  She then tried 37.5 but still she had some palpiations. Patient takes it in an empty stomach 30-60 mins before breakfast in the morning. Mom had [employer]. \par \par \par

## 2022-10-18 NOTE — ASSESSMENT
[FreeTextEntry1] : 42 year old female  with a past medical history of hypothyroidism who presents for management of her thyroid disease\par \par 1. Hypothyroidism \par Likely Hashimoto's Thyroiditis\par Will rec to take Levothyroxine 25 mcg mon-fri and 50 mcg sat and sun\par Will repeat levels in 3 months\par

## 2022-10-20 ENCOUNTER — APPOINTMENT (OUTPATIENT)
Dept: MAMMOGRAPHY | Facility: CLINIC | Age: 42
End: 2022-10-20

## 2022-11-11 ENCOUNTER — APPOINTMENT (OUTPATIENT)
Dept: MAMMOGRAPHY | Facility: CLINIC | Age: 42
End: 2022-11-11

## 2022-11-11 ENCOUNTER — RESULT REVIEW (OUTPATIENT)
Age: 42
End: 2022-11-11

## 2022-11-11 ENCOUNTER — OUTPATIENT (OUTPATIENT)
Dept: OUTPATIENT SERVICES | Facility: HOSPITAL | Age: 42
LOS: 1 days | End: 2022-11-11
Payer: MEDICAID

## 2022-11-11 DIAGNOSIS — Z12.39 ENCOUNTER FOR OTHER SCREENING FOR MALIGNANT NEOPLASM OF BREAST: ICD-10-CM

## 2022-11-11 DIAGNOSIS — Z98.1 ARTHRODESIS STATUS: Chronic | ICD-10-CM

## 2022-11-11 DIAGNOSIS — Z90.89 ACQUIRED ABSENCE OF OTHER ORGANS: Chronic | ICD-10-CM

## 2022-11-11 PROCEDURE — 77063 BREAST TOMOSYNTHESIS BI: CPT

## 2022-11-11 PROCEDURE — 77063 BREAST TOMOSYNTHESIS BI: CPT | Mod: 26

## 2022-11-11 PROCEDURE — 77067 SCR MAMMO BI INCL CAD: CPT

## 2022-11-11 PROCEDURE — 77067 SCR MAMMO BI INCL CAD: CPT | Mod: 26

## 2022-11-12 DIAGNOSIS — R92.8 OTHER ABNORMAL AND INCONCLUSIVE FINDINGS ON DIAGNOSTIC IMAGING OF BREAST: ICD-10-CM

## 2022-12-10 ENCOUNTER — APPOINTMENT (OUTPATIENT)
Dept: ULTRASOUND IMAGING | Facility: CLINIC | Age: 42
End: 2022-12-10

## 2022-12-10 ENCOUNTER — RESULT REVIEW (OUTPATIENT)
Age: 42
End: 2022-12-10

## 2022-12-10 ENCOUNTER — OUTPATIENT (OUTPATIENT)
Dept: OUTPATIENT SERVICES | Facility: HOSPITAL | Age: 42
LOS: 1 days | End: 2022-12-10
Payer: MEDICAID

## 2022-12-10 DIAGNOSIS — Z90.89 ACQUIRED ABSENCE OF OTHER ORGANS: Chronic | ICD-10-CM

## 2022-12-10 DIAGNOSIS — R92.8 OTHER ABNORMAL AND INCONCLUSIVE FINDINGS ON DIAGNOSTIC IMAGING OF BREAST: ICD-10-CM

## 2022-12-10 DIAGNOSIS — Z98.1 ARTHRODESIS STATUS: Chronic | ICD-10-CM

## 2022-12-10 PROCEDURE — 76642 ULTRASOUND BREAST LIMITED: CPT

## 2022-12-10 PROCEDURE — 76642 ULTRASOUND BREAST LIMITED: CPT | Mod: 26,LT

## 2022-12-14 ENCOUNTER — TRANSCRIPTION ENCOUNTER (OUTPATIENT)
Age: 42
End: 2022-12-14

## 2023-01-20 ENCOUNTER — NON-APPOINTMENT (OUTPATIENT)
Age: 43
End: 2023-01-20

## 2023-02-08 LAB
T3 SERPL-MCNC: 115 NG/DL
T4 FREE SERPL-MCNC: 0.8 NG/DL
THYROGLOB AB SERPL-ACNC: <20 IU/ML
THYROPEROXIDASE AB SERPL IA-ACNC: <10 IU/ML
TSH SERPL-ACNC: 2.91 UIU/ML

## 2023-02-13 ENCOUNTER — NON-APPOINTMENT (OUTPATIENT)
Age: 43
End: 2023-02-13

## 2023-02-13 ENCOUNTER — APPOINTMENT (OUTPATIENT)
Dept: INTERNAL MEDICINE | Facility: CLINIC | Age: 43
End: 2023-02-13
Payer: MEDICAID

## 2023-02-13 DIAGNOSIS — J01.00 ACUTE MAXILLARY SINUSITIS, UNSPECIFIED: ICD-10-CM

## 2023-02-13 PROCEDURE — 99443: CPT

## 2023-02-13 RX ORDER — FLUTICASONE PROPIONATE 50 UG/1
50 SPRAY, METERED NASAL DAILY
Qty: 1 | Refills: 1 | Status: ACTIVE | COMMUNITY
Start: 2023-02-13 | End: 1900-01-01

## 2023-02-13 RX ORDER — DOXYCYCLINE HYCLATE 100 MG/1
100 CAPSULE ORAL
Qty: 20 | Refills: 0 | Status: ACTIVE | COMMUNITY
Start: 2023-02-13 | End: 1900-01-01

## 2023-02-13 NOTE — HISTORY OF PRESENT ILLNESS
[FreeTextEntry8] : Pt consents to this telephone consultation. \par Pt is c/o bilateral maxillary sinus discomfort for 5 days

## 2023-02-14 ENCOUNTER — APPOINTMENT (OUTPATIENT)
Dept: ENDOCRINOLOGY | Facility: CLINIC | Age: 43
End: 2023-02-14
Payer: MEDICAID

## 2023-02-14 VITALS
DIASTOLIC BLOOD PRESSURE: 88 MMHG | HEART RATE: 60 BPM | BODY MASS INDEX: 42.09 KG/M2 | WEIGHT: 285 LBS | OXYGEN SATURATION: 99 % | SYSTOLIC BLOOD PRESSURE: 127 MMHG

## 2023-02-14 PROCEDURE — 99213 OFFICE O/P EST LOW 20 MIN: CPT

## 2023-02-14 NOTE — ASSESSMENT
[FreeTextEntry1] : 42 year old female  with a past medical history of hypothyroidism who presents for management of her thyroid disease\par \par 1. Hypothyroidism \par Likely Hashimoto's Thyroiditis\par Will rec to cont Levothyroxine 25 mcg and 50 mcg every other day\par After the summer can inc to more 50 mcg before next follow up\par

## 2023-02-14 NOTE — HISTORY OF PRESENT ILLNESS
[FreeTextEntry1] : Ms. MANDO RICHARDSON is a 42 year old female  with a past medical history of hypothyroidism who presents for management of her thyroid disease. Patient is on 50 mcg and 25 mcg every other day. She is tolerating it. \par \par \par

## 2023-03-09 ENCOUNTER — NON-APPOINTMENT (OUTPATIENT)
Age: 43
End: 2023-03-09

## 2023-05-12 ENCOUNTER — NON-APPOINTMENT (OUTPATIENT)
Age: 43
End: 2023-05-12

## 2023-06-16 ENCOUNTER — RESULT REVIEW (OUTPATIENT)
Age: 43
End: 2023-06-16

## 2023-06-16 ENCOUNTER — APPOINTMENT (OUTPATIENT)
Dept: ULTRASOUND IMAGING | Facility: CLINIC | Age: 43
End: 2023-06-16
Payer: MEDICAID

## 2023-06-16 ENCOUNTER — OUTPATIENT (OUTPATIENT)
Dept: OUTPATIENT SERVICES | Facility: HOSPITAL | Age: 43
LOS: 1 days | End: 2023-06-16
Payer: MEDICAID

## 2023-06-16 ENCOUNTER — APPOINTMENT (OUTPATIENT)
Dept: MAMMOGRAPHY | Facility: CLINIC | Age: 43
End: 2023-06-16
Payer: MEDICAID

## 2023-06-16 DIAGNOSIS — Z00.8 ENCOUNTER FOR OTHER GENERAL EXAMINATION: ICD-10-CM

## 2023-06-16 DIAGNOSIS — Z90.89 ACQUIRED ABSENCE OF OTHER ORGANS: Chronic | ICD-10-CM

## 2023-06-16 DIAGNOSIS — Z98.1 ARTHRODESIS STATUS: Chronic | ICD-10-CM

## 2023-06-16 DIAGNOSIS — N64.89 OTHER SPECIFIED DISORDERS OF BREAST: ICD-10-CM

## 2023-06-16 PROCEDURE — 77065 DX MAMMO INCL CAD UNI: CPT

## 2023-06-16 PROCEDURE — 76642 ULTRASOUND BREAST LIMITED: CPT

## 2023-06-16 PROCEDURE — 76642 ULTRASOUND BREAST LIMITED: CPT | Mod: 26,LT

## 2023-06-16 PROCEDURE — 77065 DX MAMMO INCL CAD UNI: CPT | Mod: 26,LT

## 2023-06-16 PROCEDURE — G0279: CPT

## 2023-06-16 PROCEDURE — 77061 BREAST TOMOSYNTHESIS UNI: CPT | Mod: 26

## 2023-06-19 ENCOUNTER — RESULT REVIEW (OUTPATIENT)
Age: 43
End: 2023-06-19

## 2023-07-07 ENCOUNTER — NON-APPOINTMENT (OUTPATIENT)
Age: 43
End: 2023-07-07

## 2023-07-08 ENCOUNTER — NON-APPOINTMENT (OUTPATIENT)
Age: 43
End: 2023-07-08

## 2023-08-18 ENCOUNTER — LABORATORY RESULT (OUTPATIENT)
Age: 43
End: 2023-08-18

## 2023-09-12 ENCOUNTER — APPOINTMENT (OUTPATIENT)
Dept: ENDOCRINOLOGY | Facility: CLINIC | Age: 43
End: 2023-09-12
Payer: MEDICAID

## 2023-09-12 LAB
T3 SERPL-MCNC: 100 NG/DL
T4 FREE SERPL-MCNC: 0.9 NG/DL
TSH SERPL-ACNC: 1.55 UIU/ML

## 2023-09-12 PROCEDURE — 99213 OFFICE O/P EST LOW 20 MIN: CPT | Mod: 95

## 2023-10-03 ENCOUNTER — NON-APPOINTMENT (OUTPATIENT)
Age: 43
End: 2023-10-03

## 2023-11-02 ENCOUNTER — APPOINTMENT (OUTPATIENT)
Dept: ENDOCRINOLOGY | Facility: CLINIC | Age: 43
End: 2023-11-02

## 2023-11-25 ENCOUNTER — NON-APPOINTMENT (OUTPATIENT)
Age: 43
End: 2023-11-25

## 2024-01-07 ENCOUNTER — NON-APPOINTMENT (OUTPATIENT)
Age: 44
End: 2024-01-07

## 2024-01-15 ENCOUNTER — TRANSCRIPTION ENCOUNTER (OUTPATIENT)
Age: 44
End: 2024-01-15

## 2024-01-15 ENCOUNTER — OUTPATIENT (OUTPATIENT)
Dept: OUTPATIENT SERVICES | Facility: HOSPITAL | Age: 44
LOS: 1 days | End: 2024-01-15
Payer: MEDICAID

## 2024-01-15 ENCOUNTER — APPOINTMENT (OUTPATIENT)
Dept: INTERNAL MEDICINE | Facility: CLINIC | Age: 44
End: 2024-01-15
Payer: MEDICAID

## 2024-01-15 ENCOUNTER — APPOINTMENT (OUTPATIENT)
Dept: RADIOLOGY | Facility: CLINIC | Age: 44
End: 2024-01-15
Payer: MEDICAID

## 2024-01-15 VITALS
BODY MASS INDEX: 41.47 KG/M2 | TEMPERATURE: 98.3 F | WEIGHT: 280 LBS | SYSTOLIC BLOOD PRESSURE: 128 MMHG | HEART RATE: 107 BPM | DIASTOLIC BLOOD PRESSURE: 76 MMHG | HEIGHT: 69 IN | RESPIRATION RATE: 16 BRPM | OXYGEN SATURATION: 99 %

## 2024-01-15 DIAGNOSIS — Z98.1 ARTHRODESIS STATUS: Chronic | ICD-10-CM

## 2024-01-15 DIAGNOSIS — R09.82 POSTNASAL DRIP: ICD-10-CM

## 2024-01-15 DIAGNOSIS — E66.01 MORBID (SEVERE) OBESITY DUE TO EXCESS CALORIES: ICD-10-CM

## 2024-01-15 DIAGNOSIS — R05.9 COUGH, UNSPECIFIED: ICD-10-CM

## 2024-01-15 DIAGNOSIS — J20.8 ACUTE BRONCHITIS DUE TO OTHER SPECIFIED ORGANISMS: ICD-10-CM

## 2024-01-15 DIAGNOSIS — Z90.89 ACQUIRED ABSENCE OF OTHER ORGANS: Chronic | ICD-10-CM

## 2024-01-15 PROCEDURE — 71046 X-RAY EXAM CHEST 2 VIEWS: CPT | Mod: 26

## 2024-01-15 PROCEDURE — 99214 OFFICE O/P EST MOD 30 MIN: CPT

## 2024-01-15 PROCEDURE — 71046 X-RAY EXAM CHEST 2 VIEWS: CPT

## 2024-01-15 RX ORDER — FLUTICASONE PROPIONATE 50 UG/1
50 SPRAY, METERED NASAL
Qty: 1 | Refills: 0 | Status: ACTIVE | COMMUNITY
Start: 2024-01-15 | End: 1900-01-01

## 2024-01-15 RX ORDER — DOXYCYCLINE 100 MG/1
100 TABLET, FILM COATED ORAL
Qty: 14 | Refills: 0 | Status: ACTIVE | COMMUNITY
Start: 2024-01-15 | End: 1900-01-01

## 2024-01-15 NOTE — PLAN
[FreeTextEntry1] : Start antibiotic doxycycline  Start nasal spray Flonase for PND Sent for chest X-ray Advised to lose weight

## 2024-01-15 NOTE — HEALTH RISK ASSESSMENT
[Never (0 pts)] : Never (0 points) [No] : In the past 12 months have you used drugs other than those required for medical reasons? No [No falls in past year] : Patient reported no falls in the past year [Little interest or pleasure doing things] : 1) Little interest or pleasure doing things [Feeling down, depressed, or hopeless] : 2) Feeling down, depressed, or hopeless [0] : 2) Feeling down, depressed, or hopeless: Not at all (0) [PHQ-2 Negative - No further assessment needed] : PHQ-2 Negative - No further assessment needed [Never] : Never [[id number]] : 0

## 2024-01-15 NOTE — PHYSICAL EXAM
[No Acute Distress] : no acute distress [Well Nourished] : well nourished [Well Developed] : well developed [Well-Appearing] : well-appearing [Normal Voice/Communication] : normal voice/communication [Normal Sclera/Conjunctiva] : normal sclera/conjunctiva [PERRL] : pupils equal round and reactive to light [EOMI] : extraocular movements intact [Normal Outer Ear/Nose] : the outer ears and nose were normal in appearance [Normal Oropharynx] : the oropharynx was normal [Normal TMs] : both tympanic membranes were normal [No JVD] : no jugular venous distention [No Lymphadenopathy] : no lymphadenopathy [Supple] : supple [Thyroid Normal, No Nodules] : the thyroid was normal and there were no nodules present [No Respiratory Distress] : no respiratory distress  [No Accessory Muscle Use] : no accessory muscle use [Clear to Auscultation] : lungs were clear to auscultation bilaterally [Normal Rate] : normal rate  [Regular Rhythm] : with a regular rhythm [Normal S1, S2] : normal S1 and S2 [No Murmur] : no murmur heard [No Carotid Bruits] : no carotid bruits [No Abdominal Bruit] : a ~M bruit was not heard ~T in the abdomen [No Varicosities] : no varicosities [Pedal Pulses Present] : the pedal pulses are present [No Edema] : there was no peripheral edema [No Palpable Aorta] : no palpable aorta [No Extremity Clubbing/Cyanosis] : no extremity clubbing/cyanosis [Non Tender] : non-tender [Soft] : abdomen soft [Non-distended] : non-distended [No Masses] : no abdominal mass palpated [No HSM] : no HSM [Normal Bowel Sounds] : normal bowel sounds [Normal Supraclavicular Nodes] : no supraclavicular lymphadenopathy [Normal Posterior Cervical Nodes] : no posterior cervical lymphadenopathy [Normal Anterior Cervical Nodes] : no anterior cervical lymphadenopathy [No Spinal Tenderness] : no spinal tenderness [No CVA Tenderness] : no CVA  tenderness [No Joint Swelling] : no joint swelling [Grossly Normal Strength/Tone] : grossly normal strength/tone [No Rash] : no rash [Coordination Grossly Intact] : coordination grossly intact [No Focal Deficits] : no focal deficits [Normal Gait] : normal gait [Deep Tendon Reflexes (DTR)] : deep tendon reflexes were 2+ and symmetric [Speech Grossly Normal] : speech grossly normal [Memory Grossly Normal] : memory grossly normal [Normal Affect] : the affect was normal [Alert and Oriented x3] : oriented to person, place, and time [Normal Mood] : the mood was normal [Normal Insight/Judgement] : insight and judgment were intact

## 2024-01-15 NOTE — END OF VISIT
[FreeTextEntry3] : "I, Walt Licea, personally scribed the services dictated to me by Dr. Javid Smith MD in this documentation on 01/15/2024"   "I Dr. Javid Smith MD, personally performed the services described in this documentation on 01/15/2024 for the patient as scribed by Walt Licea in my presence. I have reviewed and verified that all the information is accurate and true."

## 2024-01-21 ENCOUNTER — EMERGENCY (EMERGENCY)
Facility: HOSPITAL | Age: 44
LOS: 0 days | Discharge: HOME | End: 2024-01-21
Attending: STUDENT IN AN ORGANIZED HEALTH CARE EDUCATION/TRAINING PROGRAM
Payer: MEDICAID

## 2024-01-21 VITALS
RESPIRATION RATE: 18 BRPM | TEMPERATURE: 98 F | DIASTOLIC BLOOD PRESSURE: 97 MMHG | OXYGEN SATURATION: 100 % | SYSTOLIC BLOOD PRESSURE: 151 MMHG | HEART RATE: 77 BPM

## 2024-01-21 VITALS — HEIGHT: 69 IN | WEIGHT: 279.99 LBS

## 2024-01-21 DIAGNOSIS — Z98.1 ARTHRODESIS STATUS: Chronic | ICD-10-CM

## 2024-01-21 DIAGNOSIS — Z90.89 ACQUIRED ABSENCE OF OTHER ORGANS: Chronic | ICD-10-CM
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
## 2024-01-21 DIAGNOSIS — S89.92XA UNSPECIFIED INJURY OF LEFT LOWER LEG, INITIAL ENCOUNTER: ICD-10-CM

## 2024-01-21 DIAGNOSIS — Z88.0 ALLERGY STATUS TO PENICILLIN: ICD-10-CM

## 2024-01-21 DIAGNOSIS — E03.9 HYPOTHYROIDISM, UNSPECIFIED: ICD-10-CM

## 2024-01-21 DIAGNOSIS — M25.562 PAIN IN LEFT KNEE: ICD-10-CM
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
## 2024-01-21 PROCEDURE — 99284 EMERGENCY DEPT VISIT MOD MDM: CPT

## 2024-01-21 PROCEDURE — 73562 X-RAY EXAM OF KNEE 3: CPT | Mod: LT

## 2024-01-21 PROCEDURE — 73562 X-RAY EXAM OF KNEE 3: CPT | Mod: 26,LT

## 2024-01-21 PROCEDURE — 99283 EMERGENCY DEPT VISIT LOW MDM: CPT

## 2024-01-21 RX ORDER — OXYCODONE HYDROCHLORIDE 5 MG/1
1 TABLET ORAL
Qty: 9 | Refills: 0
Start: 2024-01-21

## 2024-01-21 RX ORDER — ACETAMINOPHEN 500 MG
1000 TABLET ORAL ONCE
Refills: 0 | Status: COMPLETED | OUTPATIENT
Start: 2024-01-21 | End: 2024-01-21

## 2024-01-21 RX ADMIN — Medication 1000 MILLIGRAM(S): at 11:17

## 2024-01-21 NOTE — ED ADULT TRIAGE NOTE - CHIEF COMPLAINT QUOTE
patient c/o knee pain.  patient slipped and fell on ice yesterday.  denies head strike.  c/o left knee pain.

## 2024-01-21 NOTE — ED STATDOCS - PROGRESS NOTE DETAILS
patient seen and evaluated.  negative knee xray, suspect meniscal or ligamentous injury.  Advised RICE, placed in ace, nonweightbearing with crutches, orthopedic follow up and pain control  -Domenic Starks PA-C

## 2024-01-21 NOTE — ED STATDOCS - PATIENT PORTAL LINK FT
You can access the FollowMyHealth Patient Portal offered by [facility] by registering at the following website: [email or web address]. By joining [employer]’s FollowMyHealth portal, you will also be able to view your health information using other applications (apps) compatible with our system.

## 2024-01-21 NOTE — ED STATDOCS - ATTENDING APP SHARED VISIT CONTRIBUTION OF CARE
I, Joie Ash DO,  performed the initial face to face bedside interview with this patient regarding history of present illness, review of symptoms and relevant past medical, social and family history.  I completed an independent physical examination.  I was the initial provider who evaluated this patient.   I personally saw the patient and performed a substantive portion of the visit including all aspects of the medical decision making.  I have signed out the follow up of any pending tests (i.e. labs, radiological studies) to the ACP.  I have communicated the patient’s plan of care and disposition with the ACP.  The history, relevant review of systems, past medical and surgical history, medical decision making, and physical examination was documented by the scribe in my presence and I attest to the accuracy of the documentation.

## 2024-01-21 NOTE — ED ADULT NURSE NOTE - NSSEPSISSUSPECTED_ED_A_ED
Patient was seen for a 55 minute diagnostic interview, as part of a bariatric evaluation.  She and this writer were masked.      Psychological testing was done.  A follow up session has been scheduled.      Impressions:  Unspecified eating disorder   No

## 2024-01-21 NOTE — ED STATDOCS - CARE PROVIDER_API CALL
Valente Camargo  Orthopaedic Surgery  [address] [place] [address]  Phone: [phone]  Fax: [phone]  Follow Up Time:     Norman Johnson  Orthopaedic Surgery  [address] [place] [id number]  Phone: [phone]  Fax: [phone]  Follow Up Time:

## 2024-01-21 NOTE — ED ADULT NURSE NOTE - OBJECTIVE STATEMENT
Ambulatory to ER with c/o L knee pain. Patient a & ox4, respirations even and unlabored on room air. Await MD roberts.

## 2024-01-21 NOTE — ED STATDOCS - OBJECTIVE STATEMENT
44 y/o F with PMHx of L ankle pain and hypothyroidism presents to the ED c/o L knee pain. Pt slipped and fell on ice yesterday. Denies head strike. Pt took Aleve for pain at approx. 7:30am. Pt allergic to penicillin. Pt denies any other injuries from the fall.

## 2024-01-21 NOTE — ED ADULT NURSE NOTE - NSFALLUNIVINTERV_ED_ALL_ED
Bed/Stretcher in lowest position, wheels locked, appropriate side rails in place/Call bell, personal items and telephone in reach/Instruct patient to call for assistance before getting out of bed/chair/stretcher/Non-slip footwear applied when patient is off stretcher/[place] to call system/Physically safe environment - no spills, clutter or unnecessary equipment/Purposeful proactive rounding/Room/bathroom lighting operational, light cord in reach

## 2024-01-21 NOTE — ED STATDOCS - NSFOLLOWUPINSTRUCTIONS_ED_ALL_ED_FT
Knee Sprain, Adult  A person's knee joint, showing the ligament.  A knee sprain is a stretch or tear in a knee ligament. Knee ligaments are tissues that connect the bones of the knee to each other.    What are the causes?  This condition is often caused by:  A fall.  An injury to the knee.  What are the signs or symptoms?  Trouble straightening or bending the leg.  Problems with your knee, like:  Swelling.  Bruising.  Tenderness or pain.  Sudden muscle tightening (spasm).  How is this treated?  You may need to:  Keep your knee still with a cast, brace, or splint.  Put ice on the knee. This helps with pain and swelling.  Raise your knee above the level of your heart when you are resting. This helps with pain and swelling.  Take medicine for pain.  Do exercises to keep your knee from being weak or stiff.  Have surgery. This may be needed if the ligament is fully torn.  Follow these instructions at home:  If you have a splint or brace that can be taken off:    Wear the splint or brace as told by your doctor. Take it off only as told by your doctor.  Check the skin around the splint or brace every day. Tell your doctor if you see problems.  Loosen the splint or brace if your toes:  Tingle.  Become numb.  Turn cold and blue.  Keep the splint or brace clean and dry.  If you have a cast that cannot be taken off:    Do not put pressure on any part of the cast until it is fully hardened.  Do not stick anything inside the cast to scratch your skin.  Check the skin around the cast every day. Tell your doctor if you see problems.  You may put lotion on dry skin around the edges of the cast. Do not put lotion on the skin under the cast.  Keep the cast clean and dry.  Bathing    If you have a splint, brace, or cast that is not waterproof:  Do not let it get wet.  Cover it with a watertight covering when you take a bath or a shower.  Managing pain, stiffness, and swelling    Bag of ice on a towel on the skin.  If told, put ice on the injured area. To do this:  If you have a removable splint or brace, take it off as told by your doctor.  Put ice in a plastic bag.  Place a towel between your skin and the bag or between your cast and the bag.  Leave the ice on for 20 minutes, 2–3 times a day.  If your skin turns bright red, take off the ice right away to prevent skin damage. The risk of skin damage is higher if you cannot feel pain, heat, or cold.  Move your toes often.  Raise the injured area above the level of your heart while you are sitting or lying down.  General instructions    Take over-the-counter and prescription medicines only as told by your doctor.  Do not smoke or use any products that contain nicotine or tobacco. These can make it take longer for your knee to heal. If you need help quitting, ask your doctor.  Do exercises as told by your doctor.  Contact a doctor if:  Your pain gets worse.  The cast, brace, or splint does not fit right or gets damaged.  Get help right away if:  You can't use your knee to support your body weight for standing or walking.  You can't move the injured area.  Your knee kristie, or you have pain after you walk only a few steps.  You have very bad pain or swelling, or you lose feeling in your leg below the cast, brace, or splint.  Your foot or toes are numb, cold, or blue after you loosen your splint or brace.  This information is not intended to replace advice given to you by your health care provider. Make sure you discuss any questions you have with your health care provider.

## 2024-01-21 NOTE — ED STATDOCS - CARE PROVIDERS DIRECT ADDRESSES
[email or web address]

## 2024-01-23 ENCOUNTER — APPOINTMENT (OUTPATIENT)
Dept: ORTHOPEDIC SURGERY | Facility: CLINIC | Age: 44
End: 2024-01-23

## 2024-01-24 ENCOUNTER — APPOINTMENT (OUTPATIENT)
Dept: ORTHOPEDIC SURGERY | Facility: CLINIC | Age: 44
End: 2024-01-24
Payer: MEDICAID

## 2024-01-24 VITALS
HEIGHT: 69 IN | DIASTOLIC BLOOD PRESSURE: 84 MMHG | TEMPERATURE: 98.8 F | WEIGHT: 280 LBS | BODY MASS INDEX: 41.47 KG/M2 | SYSTOLIC BLOOD PRESSURE: 133 MMHG | HEART RATE: 80 BPM

## 2024-01-24 DIAGNOSIS — S83.512A SPRAIN OF ANTERIOR CRUCIATE LIGAMENT OF LEFT KNEE, INITIAL ENCOUNTER: ICD-10-CM

## 2024-01-24 PROBLEM — E03.9 HYPOTHYROIDISM, UNSPECIFIED: Chronic | Status: ACTIVE | Noted: 2024-01-21

## 2024-01-24 PROCEDURE — 99213 OFFICE O/P EST LOW 20 MIN: CPT

## 2024-01-25 DIAGNOSIS — M25.562 PAIN IN LEFT KNEE: ICD-10-CM

## 2024-01-26 ENCOUNTER — OUTPATIENT (OUTPATIENT)
Dept: OUTPATIENT SERVICES | Facility: HOSPITAL | Age: 44
LOS: 1 days | End: 2024-01-26
Payer: MEDICAID

## 2024-01-26 ENCOUNTER — RESULT REVIEW (OUTPATIENT)
Age: 44
End: 2024-01-26

## 2024-01-26 ENCOUNTER — APPOINTMENT (OUTPATIENT)
Dept: MRI IMAGING | Facility: CLINIC | Age: 44
End: 2024-01-26

## 2024-01-26 ENCOUNTER — APPOINTMENT (OUTPATIENT)
Dept: ULTRASOUND IMAGING | Facility: CLINIC | Age: 44
End: 2024-01-26
Payer: MEDICAID

## 2024-01-26 ENCOUNTER — APPOINTMENT (OUTPATIENT)
Dept: MAMMOGRAPHY | Facility: CLINIC | Age: 44
End: 2024-01-26
Payer: MEDICAID

## 2024-01-26 DIAGNOSIS — S83.512A SPRAIN OF ANTERIOR CRUCIATE LIGAMENT OF LEFT KNEE, INITIAL ENCOUNTER: ICD-10-CM

## 2024-01-26 DIAGNOSIS — Z98.1 ARTHRODESIS STATUS: Chronic | ICD-10-CM

## 2024-01-26 DIAGNOSIS — Z90.89 ACQUIRED ABSENCE OF OTHER ORGANS: Chronic | ICD-10-CM

## 2024-01-26 PROCEDURE — 73721 MRI JNT OF LWR EXTRE W/O DYE: CPT | Mod: 26,LT

## 2024-01-26 PROCEDURE — 76641 ULTRASOUND BREAST COMPLETE: CPT

## 2024-01-26 PROCEDURE — G0279: CPT

## 2024-01-26 PROCEDURE — 77066 DX MAMMO INCL CAD BI: CPT | Mod: 26

## 2024-01-26 PROCEDURE — 77062 BREAST TOMOSYNTHESIS BI: CPT | Mod: 26

## 2024-01-26 PROCEDURE — 76641 ULTRASOUND BREAST COMPLETE: CPT | Mod: 26,LT

## 2024-01-26 PROCEDURE — 77066 DX MAMMO INCL CAD BI: CPT

## 2024-01-26 PROCEDURE — 73721 MRI JNT OF LWR EXTRE W/O DYE: CPT

## 2024-02-06 ENCOUNTER — APPOINTMENT (OUTPATIENT)
Dept: INTERNAL MEDICINE | Facility: CLINIC | Age: 44
End: 2024-02-06

## 2024-02-06 ENCOUNTER — APPOINTMENT (OUTPATIENT)
Dept: INTERNAL MEDICINE | Facility: CLINIC | Age: 44
End: 2024-02-06
Payer: MEDICAID

## 2024-02-06 VITALS
HEART RATE: 86 BPM | DIASTOLIC BLOOD PRESSURE: 84 MMHG | WEIGHT: 280 LBS | SYSTOLIC BLOOD PRESSURE: 132 MMHG | BODY MASS INDEX: 41.47 KG/M2 | OXYGEN SATURATION: 97 % | TEMPERATURE: 98.7 F | HEIGHT: 69 IN | RESPIRATION RATE: 14 BRPM

## 2024-02-06 DIAGNOSIS — R05.9 COUGH, UNSPECIFIED: ICD-10-CM

## 2024-02-06 DIAGNOSIS — J06.9 ACUTE UPPER RESPIRATORY INFECTION, UNSPECIFIED: ICD-10-CM

## 2024-02-06 DIAGNOSIS — J01.00 ACUTE MAXILLARY SINUSITIS, UNSPECIFIED: ICD-10-CM

## 2024-02-06 LAB — SARS-COV-2 AG RESP QL IA.RAPID: NEGATIVE

## 2024-02-06 PROCEDURE — 87811 SARS-COV-2 COVID19 W/OPTIC: CPT | Mod: QW

## 2024-02-06 PROCEDURE — 99214 OFFICE O/P EST MOD 30 MIN: CPT

## 2024-02-06 RX ORDER — AZITHROMYCIN 250 MG/1
250 TABLET, FILM COATED ORAL
Qty: 1 | Refills: 0 | Status: ACTIVE | COMMUNITY
Start: 2024-02-06 | End: 1900-01-01

## 2024-02-07 ENCOUNTER — APPOINTMENT (OUTPATIENT)
Dept: ORTHOPEDIC SURGERY | Facility: CLINIC | Age: 44
End: 2024-02-07
Payer: MEDICAID

## 2024-02-07 VITALS
HEIGHT: 69 IN | HEART RATE: 83 BPM | WEIGHT: 280 LBS | DIASTOLIC BLOOD PRESSURE: 85 MMHG | SYSTOLIC BLOOD PRESSURE: 128 MMHG | BODY MASS INDEX: 41.47 KG/M2

## 2024-02-07 PROCEDURE — 99213 OFFICE O/P EST LOW 20 MIN: CPT

## 2024-02-08 ENCOUNTER — APPOINTMENT (OUTPATIENT)
Dept: ENDOCRINOLOGY | Facility: CLINIC | Age: 44
End: 2024-02-08
Payer: MEDICAID

## 2024-02-08 LAB
T3 SERPL-MCNC: 110 NG/DL
T4 FREE SERPL-MCNC: 0.9 NG/DL
TSH SERPL-ACNC: 2.59 UIU/ML

## 2024-02-08 PROCEDURE — 99213 OFFICE O/P EST LOW 20 MIN: CPT

## 2024-02-08 NOTE — HISTORY OF PRESENT ILLNESS
[FreeTextEntry1] : Ms. MANDO RICHARDSON is a 43 year old female  with a past medical history of hypothyroidism who presents for management of her thyroid disease. Patient has been taking 25 mcg and 50 mcg every other day but has not seen much of an improvement in her symptoms.

## 2024-02-08 NOTE — ADDENDUM
[FreeTextEntry1] : This note was written by Merry Rm on 02/08/2024 acting as scribe for Jeremy Rick III, MD

## 2024-02-08 NOTE — HISTORY OF PRESENT ILLNESS
[de-identified] : The patient comes in today for review of MRI of the left knee, which is noted below.

## 2024-02-08 NOTE — ASSESSMENT
[FreeTextEntry1] : 43 year old female  with a past medical history of hypothyroidism who presents for management of her thyroid disease  1. Hypothyroidism  Likely Hashimoto's Thyroiditis Will rec to inc to Levothyroxine 50 mcg every day Follow up in 2 months

## 2024-02-08 NOTE — PHYSICAL EXAM
[de-identified] : Left Knee: Knee: Range of Motion in Degrees	 	                  Claimant:	Normal:	 Flexion Active	  90 	                135-degrees	 Flexion Passive	  90	                135-degrees	 Extension Active	  10	                0-5-degrees	 Extension Passive	  10	                0-5-degrees	  Range of motion limited by pain.  She is somewhat guarding.  No weakness to flexion/extension.  No evidence of instability in the AP plane or varus stress.  Mild tenderness and a 1+ gap to valgus stress.  Negative  Lachman.  Negative pivot shift.  Negative anterior drawer test.  Negative posterior drawer test.  Negative April.  Negative Apley grind.  No medial or lateral joint line tenderness.  No tenderness over the medial and lateral facet of the patella.  No patellofemoral crepitations.  No lateral tilting patella.  No patella apprehension.  No crepitation in the medial and lateral femoral condyle.  No proximal or distal swelling, edema or tenderness.  No gross motor or sensory deficits.  Mild intra-articular swelling.  2+ DP and PT pulses.  No varus or valgus malalignment.  Skin is intact.  No rashes, scars or lesions.     [de-identified] : Gait and Station:  Ambulating with a slightly antalgic to antalgic gait.  Station:  Normal.  [de-identified] : Appearance:  Well-developed, well-nourished female in no acute distress.   [de-identified] : Review of MRI of the left knee shows grade 2 MCL tear, no other ligamentous or meniscal issues.

## 2024-02-24 ENCOUNTER — APPOINTMENT (OUTPATIENT)
Dept: OBGYN | Facility: CLINIC | Age: 44
End: 2024-02-24

## 2024-03-06 ENCOUNTER — RX RENEWAL (OUTPATIENT)
Age: 44
End: 2024-03-06

## 2024-03-13 ENCOUNTER — APPOINTMENT (OUTPATIENT)
Dept: ORTHOPEDIC SURGERY | Facility: CLINIC | Age: 44
End: 2024-03-13
Payer: MEDICAID

## 2024-03-13 ENCOUNTER — NON-APPOINTMENT (OUTPATIENT)
Age: 44
End: 2024-03-13

## 2024-03-13 DIAGNOSIS — S83.412A SPRAIN OF MEDIAL COLLATERAL LIGAMENT OF LEFT KNEE, INITIAL ENCOUNTER: ICD-10-CM

## 2024-03-13 PROCEDURE — 99213 OFFICE O/P EST LOW 20 MIN: CPT

## 2024-03-14 VITALS — BODY MASS INDEX: 41.47 KG/M2 | HEIGHT: 69 IN | WEIGHT: 280 LBS

## 2024-03-14 PROBLEM — S83.412A SPRAIN OF MEDIAL COLLATERAL LIGAMENT OF LEFT KNEE, INITIAL ENCOUNTER: Status: ACTIVE | Noted: 2024-01-24

## 2024-03-18 NOTE — PHYSICAL EXAM
[de-identified] : Left Knee: Knee: Range of Motion in Degrees	 	                  Claimant:	Normal:	 Flexion Active	  90 	                135-degrees	 Flexion Passive	  90	                135-degrees	 Extension Active	  10	                0-5-degrees	 Extension Passive	  10	                0-5-degrees	  We removed the brace since it is ill-fitting.  No weakness to flexion/extension.  No evidence of instability in the AP plane or varus stress.  Mild tenderness and a 1+ gap to valgus stress.  Negative  Lachman.  Negative pivot shift.  Negative anterior drawer test.  Negative posterior drawer test.  Negative April.  Negative Apley grind.  No medial or lateral joint line tenderness.  No tenderness over the medial and lateral facet of the patella.  No patellofemoral crepitations.  No lateral tilting patella.  No patella apprehension.  No crepitation in the medial and lateral femoral condyle.  No proximal or distal swelling, edema or tenderness.  No gross motor or sensory deficits.  Mild intra-articular swelling.  2+ DP and PT pulses.  No varus or valgus malalignment.  Skin is intact.  No rashes, scars or lesions.     [de-identified] : Gait and Station:  Ambulating with a slightly antalgic to antalgic gait.  Station:  Normal.  [de-identified] : Appearance:  Well-developed, well-nourished female in no acute distress.

## 2024-03-18 NOTE — HISTORY OF PRESENT ILLNESS
[de-identified] : The patient comes in today stating she is definitely feeling better.  We removed the brace since it is ill-fitting.

## 2024-03-18 NOTE — DISCUSSION/SUMMARY
[de-identified] : The patient presents with MCL tear (healing) of the left knee.  At this time, I recommend continued bracing and reassessment in four weeks.  We have referred her back to the orthotist to get the brace adjusted.

## 2024-03-18 NOTE — ADDENDUM
[FreeTextEntry1] : This note was written by Merry Rm on 03/14/2024 acting as scribe for Jeremy Rick III, MD

## 2024-04-03 ENCOUNTER — APPOINTMENT (OUTPATIENT)
Dept: ORTHOPEDIC SURGERY | Facility: CLINIC | Age: 44
End: 2024-04-03
Payer: MEDICAID

## 2024-04-03 VITALS
WEIGHT: 280 LBS | HEIGHT: 69 IN | DIASTOLIC BLOOD PRESSURE: 88 MMHG | HEART RATE: 75 BPM | BODY MASS INDEX: 41.47 KG/M2 | SYSTOLIC BLOOD PRESSURE: 127 MMHG

## 2024-04-03 PROCEDURE — 99213 OFFICE O/P EST LOW 20 MIN: CPT

## 2024-04-04 ENCOUNTER — APPOINTMENT (OUTPATIENT)
Dept: ENDOCRINOLOGY | Facility: CLINIC | Age: 44
End: 2024-04-04
Payer: MEDICAID

## 2024-04-04 DIAGNOSIS — E03.9 HYPOTHYROIDISM, UNSPECIFIED: ICD-10-CM

## 2024-04-04 PROCEDURE — G2211 COMPLEX E/M VISIT ADD ON: CPT | Mod: NC,1L,95

## 2024-04-04 PROCEDURE — 99214 OFFICE O/P EST MOD 30 MIN: CPT | Mod: 25

## 2024-04-04 RX ORDER — LEVOTHYROXINE SODIUM 0.07 MG/1
75 TABLET ORAL
Qty: 90 | Refills: 2 | Status: ACTIVE | COMMUNITY
Start: 2022-02-16 | End: 1900-01-01

## 2024-04-04 NOTE — ASSESSMENT
[FreeTextEntry1] : 43 year old female  with a past medical history of hypothyroidism who presents for management of her thyroid disease  1. Hypothyroidism  Likely Hashimoto's Thyroiditis Will rec to inc to Levothyroxine 75 mcg QD Follow up in 2 months

## 2024-04-04 NOTE — HISTORY OF PRESENT ILLNESS
[Home] : at home, [unfilled] , at the time of the visit. [Medical Office: ([facility])___] : at the medical office located in  [Verbal consent obtained from patient] : the patient, [unfilled] [FreeTextEntry1] : Ms. MANDO RICHARDSON is a 43 year old female  with a past medical history of hypothyroidism who presents for management of her thyroid disease. Patient has increased her dose to 50 mcg daily.  She does feel that her symptoms are better but still with some fatigue and hair and skin changes.

## 2024-04-12 LAB
T3 SERPL-MCNC: 101 NG/DL
T4 FREE SERPL-MCNC: 1.1 NG/DL
TSH SERPL-ACNC: 2.58 UIU/ML

## 2024-04-17 NOTE — ADDENDUM
[FreeTextEntry1] : This note was written by Amanda Uribe on 04/04/2024 acting as scribe for Jeremy Rick III, MD

## 2024-04-17 NOTE — HISTORY OF PRESENT ILLNESS
[de-identified] : Malina comes in today for her left knee.  Overall, she is feeling a lot better, but she does have a little spot where she is still having some pain.

## 2024-04-17 NOTE — PHYSICAL EXAM
[de-identified] : Left knee: Knee: Range of Motion in Degrees	 	                  Claimant:	Normal:	 Flexion Active	         125 	      135-degrees	 Flexion Passive	  125	       135-degrees	 Extension Active	    0	                0-5-degrees	 Extension Passive	    0	                0-5-degrees	   No tenderness with valgus stress.  No tenderness along the MCL.  No weakness to flexion/extension.  Tenderness over the pes bursa.  No evidence of instability in the AP plane or varus or valgus stress.  Negative  Lachman.  Negative pivot shift.  Negative anterior drawer test.  Negative posterior drawer test.  Negative April.  Negative Apley grind.  No patellofemoral crepitations.  No lateral tilting patella.  No patella apprehension.  No crepitation in the medial and lateral femoral condyle.  No proximal or distal swelling or edema.  No gross motor or sensory deficits.  No intra-articular swelling.  Extra-articular swelling over the proximal medial aspect of the tibia.  2+ DP and PT pulses.  No varus or valgus malalignment.  Skin is intact.  No rashes, scars or lesions.     [de-identified] : Gait: Slightly antalgic to antalgic.  Station: Normal  [de-identified] : Appearance: Well-developed, well-nourished female  in no acute distress.

## 2024-04-17 NOTE — DISCUSSION/SUMMARY
[de-identified] : The patient presents with 1.) pes bursitis and 2.) healed MCL of the left knee.  At this time I recommend she wean off her brace.  I recommend ice and topical anti-inflammatory to the pes region.  She will be reassessed in three or four weeks for a possible injection.

## 2024-04-24 ENCOUNTER — APPOINTMENT (OUTPATIENT)
Dept: ORTHOPEDIC SURGERY | Facility: CLINIC | Age: 44
End: 2024-04-24

## 2024-05-08 ENCOUNTER — APPOINTMENT (OUTPATIENT)
Dept: ORTHOPEDIC SURGERY | Facility: CLINIC | Age: 44
End: 2024-05-08
Payer: MEDICAID

## 2024-05-08 VITALS — BODY MASS INDEX: 41.47 KG/M2 | HEIGHT: 69 IN | WEIGHT: 280 LBS

## 2024-05-08 DIAGNOSIS — M70.50 OTHER BURSITIS OF KNEE, UNSPECIFIED KNEE: ICD-10-CM

## 2024-05-08 PROCEDURE — 99212 OFFICE O/P EST SF 10 MIN: CPT

## 2024-05-09 PROBLEM — M70.50 PES ANSERINE BURSITIS: Status: ACTIVE | Noted: 2024-04-03

## 2024-05-09 NOTE — DISCUSSION/SUMMARY
[de-identified] : The patient presents with resolving pes bursitis of the left knee.  At this time, I instructed her on home therapeutic modalities.  She will follow up with me on an as needed basis.

## 2024-05-09 NOTE — HISTORY OF PRESENT ILLNESS
[de-identified] : The patient comes in today stating she is feeling better.  Her pain is down to a 2-3/10.

## 2024-05-09 NOTE — PHYSICAL EXAM
[de-identified] : Left Knee: Knee: Range of Motion in Degrees	 	                  Claimant:	Normal:	 Flexion Active	  135 	                135-degrees	 Flexion Passive	  135	                135-degrees	 Extension Active	  0-5	                0-5-degrees	 Extension Passive	  0-5	                0-5-degrees	  No weakness to flexion/extension.  Tenderness over the pes bursa.  No evidence of instability in the AP plane or varus or valgus stress.  Negative  Lachman.  Negative pivot shift.  Negative anterior drawer test.  Negative posterior drawer test.  Negative April.  Negative Apley grind.  No medial or lateral joint line tenderness.  No tenderness over the medial and lateral facet of the patella.  No patellofemoral crepitations.  No lateral tilting patella.  No patella apprehension.  No crepitation in the medial and lateral femoral condyle.  No proximal or distal swelling, edema or tenderness.  No gross motor or sensory deficits.  No intra-articular swelling.  Extra-articular swelling over the proximal medial aspect of the tibia.  2+ DP and PT pulses.  No varus or valgus malalignment.  Skin is intact.  No rashes, scars or lesions.     [de-identified] : Gait and Station:  Ambulating with a slightly antalgic to antalgic gait.  Station:  Normal.  [de-identified] : Appearance:  Well-developed, well-nourished female in no acute distress.

## 2024-05-09 NOTE — ADDENDUM
[FreeTextEntry1] : This note was written by Merry Rm on 05/09/2024 acting as scribe for Jeremy Rick III, MD

## 2024-06-19 ENCOUNTER — APPOINTMENT (OUTPATIENT)
Dept: INTERNAL MEDICINE | Facility: CLINIC | Age: 44
End: 2024-06-19

## 2024-07-16 ENCOUNTER — APPOINTMENT (OUTPATIENT)
Dept: INTERNAL MEDICINE | Facility: CLINIC | Age: 44
End: 2024-07-16
Payer: MEDICAID

## 2024-07-16 ENCOUNTER — APPOINTMENT (OUTPATIENT)
Dept: INTERNAL MEDICINE | Facility: CLINIC | Age: 44
End: 2024-07-16

## 2024-07-16 ENCOUNTER — LABORATORY RESULT (OUTPATIENT)
Age: 44
End: 2024-07-16

## 2024-07-16 VITALS
HEART RATE: 79 BPM | SYSTOLIC BLOOD PRESSURE: 118 MMHG | RESPIRATION RATE: 16 BRPM | HEIGHT: 69 IN | OXYGEN SATURATION: 98 % | DIASTOLIC BLOOD PRESSURE: 78 MMHG | WEIGHT: 293 LBS | BODY MASS INDEX: 43.4 KG/M2 | TEMPERATURE: 98.3 F

## 2024-07-16 DIAGNOSIS — Z00.00 ENCOUNTER FOR GENERAL ADULT MEDICAL EXAMINATION W/OUT ABNORMAL FINDINGS: ICD-10-CM

## 2024-07-16 DIAGNOSIS — E03.9 HYPOTHYROIDISM, UNSPECIFIED: ICD-10-CM

## 2024-07-16 PROCEDURE — 99396 PREV VISIT EST AGE 40-64: CPT

## 2024-07-17 LAB
25(OH)D3 SERPL-MCNC: 27.4 NG/ML
ALBUMIN SERPL ELPH-MCNC: 4.3 G/DL
ALP BLD-CCNC: 73 U/L
ALT SERPL-CCNC: 12 U/L
ANION GAP SERPL CALC-SCNC: 12 MMOL/L
APPEARANCE: CLEAR
AST SERPL-CCNC: 15 U/L
BACTERIA: NEGATIVE /HPF
BASOPHILS # BLD AUTO: 0.06 K/UL
BASOPHILS NFR BLD AUTO: 0.8 %
BILIRUB SERPL-MCNC: 0.4 MG/DL
BILIRUBIN URINE: NEGATIVE
BLOOD URINE: NEGATIVE
BUN SERPL-MCNC: 18 MG/DL
CALCIUM SERPL-MCNC: 9.3 MG/DL
CAST: 0 /LPF
CHLORIDE SERPL-SCNC: 104 MMOL/L
CHOLEST SERPL-MCNC: 307 MG/DL
CO2 SERPL-SCNC: 20 MMOL/L
COLOR: YELLOW
CREAT SERPL-MCNC: 1.05 MG/DL
EGFR: 67 ML/MIN/1.73M2
EOSINOPHIL # BLD AUTO: 0.09 K/UL
EOSINOPHIL NFR BLD AUTO: 1.2 %
EPITHELIAL CELLS: 2 /HPF
ESTIMATED AVERAGE GLUCOSE: 117 MG/DL
FOLATE SERPL-MCNC: 5.7 NG/ML
GLUCOSE QUALITATIVE U: NEGATIVE MG/DL
GLUCOSE SERPL-MCNC: 95 MG/DL
HBA1C MFR BLD HPLC: 5.7 %
HCT VFR BLD CALC: 44.2 %
HDLC SERPL-MCNC: 77 MG/DL
HGB BLD-MCNC: 14.2 G/DL
IMM GRANULOCYTES NFR BLD AUTO: 0.3 %
KETONES URINE: NEGATIVE MG/DL
LDLC SERPL CALC-MCNC: 210 MG/DL
LEUKOCYTE ESTERASE URINE: ABNORMAL
LYMPHOCYTES # BLD AUTO: 2.08 K/UL
LYMPHOCYTES NFR BLD AUTO: 27.8 %
MAN DIFF?: NORMAL
MCHC RBC-ENTMCNC: 29.5 PG
MCHC RBC-ENTMCNC: 32.1 GM/DL
MCV RBC AUTO: 91.9 FL
MICROSCOPIC-UA: NORMAL
MONOCYTES # BLD AUTO: 0.51 K/UL
MONOCYTES NFR BLD AUTO: 6.8 %
NEUTROPHILS # BLD AUTO: 4.71 K/UL
NEUTROPHILS NFR BLD AUTO: 63.1 %
NITRITE URINE: NEGATIVE
NONHDLC SERPL-MCNC: 230 MG/DL
PH URINE: 6.5
PLATELET # BLD AUTO: 302 K/UL
POTASSIUM SERPL-SCNC: 4.5 MMOL/L
PROT SERPL-MCNC: 7.4 G/DL
PROTEIN URINE: NEGATIVE MG/DL
RBC # BLD: 4.81 M/UL
RBC # FLD: 14.2 %
RED BLOOD CELLS URINE: 1 /HPF
REVIEW: NORMAL
SODIUM SERPL-SCNC: 136 MMOL/L
SPECIFIC GRAVITY URINE: 1.01
T3RU NFR SERPL: 1.2 TBI
T4 FREE SERPL-MCNC: 0.9 NG/DL
TRIGL SERPL-MCNC: 117 MG/DL
TSH SERPL-ACNC: 1.77 UIU/ML
UROBILINOGEN URINE: 0.2 MG/DL
VIT B12 SERPL-MCNC: 822 PG/ML
WBC # FLD AUTO: 7.47 K/UL
WHITE BLOOD CELLS URINE: 2 /HPF

## 2024-08-12 DIAGNOSIS — Z00.00 ENCOUNTER FOR GENERAL ADULT MEDICAL EXAMINATION W/OUT ABNORMAL FINDINGS: ICD-10-CM

## 2024-08-14 LAB
MEV IGG FLD QL IA: 38.2 AU/ML
MEV IGG+IGM SER-IMP: POSITIVE
MUV AB SER-ACNC: NORMAL
MUV IGG SER QL IA: 10 AU/ML
RUBV IGG FLD-ACNC: 0.61 INDEX
RUBV IGG SER-IMP: NEGATIVE
VZV AB TITR SER: POSITIVE
VZV IGG SER IF-ACNC: 1409 INDEX

## 2024-08-15 ENCOUNTER — NON-APPOINTMENT (OUTPATIENT)
Age: 44
End: 2024-08-15

## 2024-08-15 ENCOUNTER — APPOINTMENT (OUTPATIENT)
Dept: CARDIOLOGY | Facility: CLINIC | Age: 44
End: 2024-08-15
Payer: MEDICAID

## 2024-08-15 VITALS
WEIGHT: 288 LBS | BODY MASS INDEX: 42.53 KG/M2 | HEART RATE: 61 BPM | SYSTOLIC BLOOD PRESSURE: 128 MMHG | OXYGEN SATURATION: 94 % | DIASTOLIC BLOOD PRESSURE: 86 MMHG

## 2024-08-15 DIAGNOSIS — E78.5 HYPERLIPIDEMIA, UNSPECIFIED: ICD-10-CM

## 2024-08-15 DIAGNOSIS — E66.01 MORBID (SEVERE) OBESITY DUE TO EXCESS CALORIES: ICD-10-CM

## 2024-08-15 DIAGNOSIS — R73.03 PREDIABETES.: ICD-10-CM

## 2024-08-15 PROCEDURE — 99401 PREV MED CNSL INDIV APPRX 15: CPT

## 2024-08-15 PROCEDURE — 99204 OFFICE O/P NEW MOD 45 MIN: CPT | Mod: 25

## 2024-08-15 PROCEDURE — 93000 ELECTROCARDIOGRAM COMPLETE: CPT

## 2024-08-15 PROCEDURE — G2211 COMPLEX E/M VISIT ADD ON: CPT | Mod: NC

## 2024-08-15 RX ORDER — TIRZEPATIDE 2.5 MG/.5ML
2.5 INJECTION, SOLUTION SUBCUTANEOUS
Qty: 1 | Refills: 1 | Status: ACTIVE | COMMUNITY
Start: 2024-08-15 | End: 1900-01-01

## 2024-08-16 ENCOUNTER — MED ADMIN CHARGE (OUTPATIENT)
Age: 44
End: 2024-08-16

## 2024-08-16 ENCOUNTER — APPOINTMENT (OUTPATIENT)
Dept: INTERNAL MEDICINE | Facility: CLINIC | Age: 44
End: 2024-08-16
Payer: MEDICAID

## 2024-08-16 DIAGNOSIS — Z23 ENCOUNTER FOR IMMUNIZATION: ICD-10-CM

## 2024-08-16 PROCEDURE — 90471 IMMUNIZATION ADMIN: CPT

## 2024-08-16 PROCEDURE — 90707 MMR VACCINE SC: CPT

## 2024-08-18 LAB
M TB IFN-G BLD-IMP: NEGATIVE
QUANTIFERON TB PLUS MITOGEN MINUS NIL: 8.11 IU/ML
QUANTIFERON TB PLUS NIL: 0.02 IU/ML
QUANTIFERON TB PLUS TB1 MINUS NIL: 0 IU/ML
QUANTIFERON TB PLUS TB2 MINUS NIL: 0 IU/ML

## 2024-09-12 PROBLEM — E66.9 OBESITY: Status: ACTIVE | Noted: 2024-09-12

## 2024-09-19 ENCOUNTER — APPOINTMENT (OUTPATIENT)
Dept: CARDIOLOGY | Facility: CLINIC | Age: 44
End: 2024-09-19
Payer: MEDICAID

## 2024-09-19 DIAGNOSIS — R73.03 PREDIABETES.: ICD-10-CM

## 2024-09-19 PROCEDURE — 99214 OFFICE O/P EST MOD 30 MIN: CPT

## 2024-09-19 PROCEDURE — 93306 TTE W/DOPPLER COMPLETE: CPT

## 2024-09-19 PROCEDURE — G2211 COMPLEX E/M VISIT ADD ON: CPT | Mod: NC

## 2024-09-25 ENCOUNTER — APPOINTMENT (OUTPATIENT)
Dept: CARDIOLOGY | Facility: CLINIC | Age: 44
End: 2024-09-25

## 2024-09-25 DIAGNOSIS — E66.9 OBESITY, UNSPECIFIED: ICD-10-CM

## 2024-09-25 DIAGNOSIS — E78.5 HYPERLIPIDEMIA, UNSPECIFIED: ICD-10-CM

## 2024-09-25 PROCEDURE — 97802 MEDICAL NUTRITION INDIV IN: CPT | Mod: 95

## 2024-09-26 ENCOUNTER — APPOINTMENT (OUTPATIENT)
Dept: ENDOCRINOLOGY | Facility: CLINIC | Age: 44
End: 2024-09-26
Payer: MEDICAID

## 2024-09-26 VITALS
DIASTOLIC BLOOD PRESSURE: 90 MMHG | WEIGHT: 281 LBS | HEIGHT: 69 IN | OXYGEN SATURATION: 97 % | HEART RATE: 80 BPM | BODY MASS INDEX: 41.62 KG/M2 | SYSTOLIC BLOOD PRESSURE: 135 MMHG

## 2024-09-26 DIAGNOSIS — E03.9 HYPOTHYROIDISM, UNSPECIFIED: ICD-10-CM

## 2024-09-26 LAB
T3FREE SERPL-MCNC: 2.43 PG/ML
T4 FREE SERPL-MCNC: 1.1 NG/DL
TSH SERPL-ACNC: 0.91 UIU/ML

## 2024-09-26 PROCEDURE — 99214 OFFICE O/P EST MOD 30 MIN: CPT

## 2024-09-26 NOTE — HISTORY OF PRESENT ILLNESS
[FreeTextEntry1] : Ms. MANDO RCIHARDSON is a 44 year old female  with a past medical history of hypothyroidism who presents for management of her thyroid disease. Patient has increased her dose to 75 mcg daily.  She still with some fatigue and hair and skin changes.  She feels that the 75 might be too much.  She was also started on Mounjaro recently because she was trying to lose weight on the keto diet.  But it increased her cholesterol.  She has lost 6 pounds thus far

## 2024-09-26 NOTE — PHYSICAL EXAM
[de-identified] : Deferred [Alert] : alert [Well Nourished] : well nourished [No Acute Distress] : no acute distress [Well Developed] : well developed [Normal Sclera/Conjunctiva] : normal sclera/conjunctiva [EOMI] : extra ocular movement intact [No Proptosis] : no proptosis [Normal Oropharynx] : the oropharynx was normal [Thyroid Not Enlarged] : the thyroid was not enlarged [No Thyroid Nodules] : no palpable thyroid nodules [No Respiratory Distress] : no respiratory distress [No Accessory Muscle Use] : no accessory muscle use [Clear to Auscultation] : lungs were clear to auscultation bilaterally [Normal S1, S2] : normal S1 and S2 [Normal Rate] : heart rate was normal [Regular Rhythm] : with a regular rhythm [No Edema] : no peripheral edema [Pedal Pulses Normal] : the pedal pulses are present [Normal Bowel Sounds] : normal bowel sounds [Not Tender] : non-tender [Not Distended] : not distended [Soft] : abdomen soft [Normal Anterior Cervical Nodes] : no anterior cervical lymphadenopathy [Normal Posterior Cervical Nodes] : no posterior cervical lymphadenopathy [No Spinal Tenderness] : no spinal tenderness [Spine Straight] : spine straight [No Stigmata of Cushings Syndrome] : no stigmata of Cushings Syndrome [Normal Gait] : normal gait [Normal Strength/Tone] : muscle strength and tone were normal [No Rash] : no rash [Acanthosis Nigricans] : no acanthosis nigricans [Normal Reflexes] : deep tendon reflexes were 2+ and symmetric [No Tremors] : no tremors [Oriented x3] : oriented to person, place, and time

## 2024-09-26 NOTE — HISTORY OF PRESENT ILLNESS
[FreeTextEntry1] : Ms. MANDO RICHARDSON is a 44 year old female  with a past medical history of hypothyroidism who presents for management of her thyroid disease. Patient has increased her dose to 75 mcg daily.  She still with some fatigue and hair and skin changes.  She feels that the 75 might be too much.  She was also started on Mounjaro recently because she was trying to lose weight on the keto diet.  But it increased her cholesterol.  She has lost 6 pounds thus far

## 2024-09-26 NOTE — PHYSICAL EXAM
[de-identified] : Deferred [Alert] : alert [Well Nourished] : well nourished [No Acute Distress] : no acute distress [Well Developed] : well developed [Normal Sclera/Conjunctiva] : normal sclera/conjunctiva [EOMI] : extra ocular movement intact [No Proptosis] : no proptosis [Normal Oropharynx] : the oropharynx was normal [Thyroid Not Enlarged] : the thyroid was not enlarged [No Thyroid Nodules] : no palpable thyroid nodules [No Respiratory Distress] : no respiratory distress [No Accessory Muscle Use] : no accessory muscle use [Clear to Auscultation] : lungs were clear to auscultation bilaterally [Normal S1, S2] : normal S1 and S2 [Normal Rate] : heart rate was normal [Regular Rhythm] : with a regular rhythm [No Edema] : no peripheral edema [Pedal Pulses Normal] : the pedal pulses are present [Normal Bowel Sounds] : normal bowel sounds [Not Tender] : non-tender [Not Distended] : not distended [Soft] : abdomen soft [Normal Anterior Cervical Nodes] : no anterior cervical lymphadenopathy [Normal Posterior Cervical Nodes] : no posterior cervical lymphadenopathy [No Spinal Tenderness] : no spinal tenderness [Spine Straight] : spine straight [No Stigmata of Cushings Syndrome] : no stigmata of Cushings Syndrome [Normal Gait] : normal gait [Normal Strength/Tone] : muscle strength and tone were normal [No Rash] : no rash [Acanthosis Nigricans] : no acanthosis nigricans [Normal Reflexes] : deep tendon reflexes were 2+ and symmetric [No Tremors] : no tremors [Oriented x3] : oriented to person, place, and time

## 2024-09-26 NOTE — ASSESSMENT
[FreeTextEntry1] : 44 year old female  with a past medical history of hypothyroidism who presents for management of her thyroid disease  1. Hypothyroidism  Likely Hashimoto's Thyroiditis Will rec to dec to Levothyroxine 75 mcg 6 days a week Will reevaluate in 4 months

## 2024-10-31 ENCOUNTER — APPOINTMENT (OUTPATIENT)
Dept: CARDIOLOGY | Facility: CLINIC | Age: 44
End: 2024-10-31
Payer: MEDICAID

## 2024-10-31 DIAGNOSIS — E78.5 HYPERLIPIDEMIA, UNSPECIFIED: ICD-10-CM

## 2024-10-31 DIAGNOSIS — E66.9 OBESITY, UNSPECIFIED: ICD-10-CM

## 2024-10-31 DIAGNOSIS — R73.03 PREDIABETES.: ICD-10-CM

## 2024-10-31 PROCEDURE — 99214 OFFICE O/P EST MOD 30 MIN: CPT | Mod: 95

## 2024-10-31 PROCEDURE — G2211 COMPLEX E/M VISIT ADD ON: CPT | Mod: NC,95

## 2024-10-31 RX ORDER — TIRZEPATIDE 5 MG/.5ML
5 INJECTION, SOLUTION SUBCUTANEOUS
Qty: 1 | Refills: 0 | Status: ACTIVE | COMMUNITY
Start: 2024-10-31 | End: 1900-01-01

## 2024-12-26 ENCOUNTER — APPOINTMENT (OUTPATIENT)
Dept: CARDIOLOGY | Facility: CLINIC | Age: 44
End: 2024-12-26
Payer: MEDICAID

## 2024-12-26 DIAGNOSIS — E66.01 MORBID (SEVERE) OBESITY DUE TO EXCESS CALORIES: ICD-10-CM

## 2024-12-26 DIAGNOSIS — R73.03 PREDIABETES.: ICD-10-CM

## 2024-12-26 DIAGNOSIS — E78.5 HYPERLIPIDEMIA, UNSPECIFIED: ICD-10-CM

## 2024-12-26 PROCEDURE — G2211 COMPLEX E/M VISIT ADD ON: CPT | Mod: NC,95

## 2024-12-26 PROCEDURE — 99214 OFFICE O/P EST MOD 30 MIN: CPT | Mod: 95

## 2025-01-21 ENCOUNTER — NON-APPOINTMENT (OUTPATIENT)
Age: 45
End: 2025-01-21

## 2025-01-22 ENCOUNTER — APPOINTMENT (OUTPATIENT)
Dept: INTERNAL MEDICINE | Facility: CLINIC | Age: 45
End: 2025-01-22
Payer: MEDICAID

## 2025-01-22 VITALS
WEIGHT: 281 LBS | OXYGEN SATURATION: 99 % | HEIGHT: 69 IN | RESPIRATION RATE: 16 BRPM | TEMPERATURE: 98.6 F | BODY MASS INDEX: 41.62 KG/M2 | HEART RATE: 101 BPM | DIASTOLIC BLOOD PRESSURE: 84 MMHG | SYSTOLIC BLOOD PRESSURE: 132 MMHG

## 2025-01-22 DIAGNOSIS — J10.1 INFLUENZA DUE TO OTHER IDENTIFIED INFLUENZA VIRUS WITH OTHER RESPIRATORY MANIFESTATIONS: ICD-10-CM

## 2025-01-22 DIAGNOSIS — B34.9 VIRAL INFECTION, UNSPECIFIED: ICD-10-CM

## 2025-01-22 LAB
FLUAV SPEC QL CULT: POSITIVE
FLUBV AG SPEC QL IA: NEGATIVE
S PYO AG SPEC QL IA: NEGATIVE
SARS-COV-2 AG RESP QL IA.RAPID: NEGATIVE

## 2025-01-22 PROCEDURE — 99214 OFFICE O/P EST MOD 30 MIN: CPT | Mod: 25

## 2025-01-22 PROCEDURE — 87804 INFLUENZA ASSAY W/OPTIC: CPT | Mod: 59,QW

## 2025-01-22 PROCEDURE — 87811 SARS-COV-2 COVID19 W/OPTIC: CPT | Mod: QW

## 2025-01-22 PROCEDURE — 87880 STREP A ASSAY W/OPTIC: CPT | Mod: QW

## 2025-01-22 RX ORDER — OSELTAMIVIR PHOSPHATE 75 MG/1
75 CAPSULE ORAL TWICE DAILY
Qty: 10 | Refills: 0 | Status: ACTIVE | COMMUNITY
Start: 2025-01-22 | End: 1900-01-01

## 2025-01-24 ENCOUNTER — APPOINTMENT (OUTPATIENT)
Dept: CARDIOLOGY | Facility: CLINIC | Age: 45
End: 2025-01-24

## 2025-01-24 DIAGNOSIS — E66.01 MORBID (SEVERE) OBESITY DUE TO EXCESS CALORIES: ICD-10-CM

## 2025-01-27 ENCOUNTER — NON-APPOINTMENT (OUTPATIENT)
Age: 45
End: 2025-01-27

## 2025-01-28 ENCOUNTER — APPOINTMENT (OUTPATIENT)
Dept: ENDOCRINOLOGY | Facility: CLINIC | Age: 45
End: 2025-01-28

## 2025-02-10 ENCOUNTER — APPOINTMENT (OUTPATIENT)
Dept: GASTROENTEROLOGY | Facility: CLINIC | Age: 45
End: 2025-02-10

## 2025-02-11 ENCOUNTER — APPOINTMENT (OUTPATIENT)
Dept: INTERNAL MEDICINE | Facility: CLINIC | Age: 45
End: 2025-02-11

## 2025-02-14 ENCOUNTER — APPOINTMENT (OUTPATIENT)
Dept: OBGYN | Facility: CLINIC | Age: 45
End: 2025-02-14

## 2025-02-25 ENCOUNTER — APPOINTMENT (OUTPATIENT)
Dept: ORTHOPEDIC SURGERY | Facility: CLINIC | Age: 45
End: 2025-02-25
Payer: MEDICAID

## 2025-02-25 DIAGNOSIS — M21.41 FLAT FOOT [PES PLANUS] (ACQUIRED), RIGHT FOOT: ICD-10-CM

## 2025-02-25 DIAGNOSIS — M21.42 FLAT FOOT [PES PLANUS] (ACQUIRED), RIGHT FOOT: ICD-10-CM

## 2025-02-25 DIAGNOSIS — M62.462 CONTRACTURE OF MUSCLE, LEFT LOWER LEG: ICD-10-CM

## 2025-02-25 DIAGNOSIS — M76.822 POSTERIOR TIBIAL TENDINITIS, LEFT LEG: ICD-10-CM

## 2025-02-25 DIAGNOSIS — Z98.890 OTHER SPECIFIED POSTPROCEDURAL STATES: ICD-10-CM

## 2025-02-25 PROCEDURE — 99213 OFFICE O/P EST LOW 20 MIN: CPT

## 2025-05-23 ENCOUNTER — APPOINTMENT (OUTPATIENT)
Dept: INTERNAL MEDICINE | Facility: CLINIC | Age: 45
End: 2025-05-23

## 2025-06-12 ENCOUNTER — APPOINTMENT (OUTPATIENT)
Dept: ORTHOPEDIC SURGERY | Facility: CLINIC | Age: 45
End: 2025-06-12

## 2025-07-06 ENCOUNTER — EMERGENCY (EMERGENCY)
Facility: HOSPITAL | Age: 45
LOS: 0 days | Discharge: HOME | End: 2025-07-06
Attending: EMERGENCY MEDICINE
Payer: MEDICAID

## 2025-07-06 VITALS
DIASTOLIC BLOOD PRESSURE: 75 MMHG | OXYGEN SATURATION: 100 % | RESPIRATION RATE: 16 BRPM | TEMPERATURE: 98 F | SYSTOLIC BLOOD PRESSURE: 135 MMHG | HEART RATE: 83 BPM

## 2025-07-06 VITALS — HEIGHT: 69 IN | WEIGHT: 281.75 LBS

## 2025-07-06 DIAGNOSIS — E03.9 HYPOTHYROIDISM, UNSPECIFIED: ICD-10-CM

## 2025-07-06 DIAGNOSIS — M25.552 PAIN IN LEFT HIP: ICD-10-CM

## 2025-07-06 DIAGNOSIS — Z98.1 ARTHRODESIS STATUS: Chronic | ICD-10-CM
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
## 2025-07-06 DIAGNOSIS — Z90.89 ACQUIRED ABSENCE OF OTHER ORGANS: Chronic | ICD-10-CM

## 2025-07-06 DIAGNOSIS — Z88.0 ALLERGY STATUS TO PENICILLIN: ICD-10-CM

## 2025-07-06 PROCEDURE — 73502 X-RAY EXAM HIP UNI 2-3 VIEWS: CPT | Mod: LT

## 2025-07-06 PROCEDURE — 99284 EMERGENCY DEPT VISIT MOD MDM: CPT

## 2025-07-06 PROCEDURE — 73502 X-RAY EXAM HIP UNI 2-3 VIEWS: CPT | Mod: 26,LT

## 2025-07-06 PROCEDURE — 99283 EMERGENCY DEPT VISIT LOW MDM: CPT | Mod: 25

## 2025-07-06 RX ORDER — CYCLOBENZAPRINE HYDROCHLORIDE 15 MG/1
1 CAPSULE, EXTENDED RELEASE ORAL
Qty: 15 | Refills: 0
Start: 2025-07-06 | End: 2025-07-10

## 2025-07-06 RX ORDER — LIDOCAINE HYDROCHLORIDE 20 MG/ML
1 JELLY TOPICAL ONCE
Refills: 0 | Status: COMPLETED | OUTPATIENT
Start: 2025-07-06 | End: 2025-07-06

## 2025-07-06 RX ORDER — IBUPROFEN 200 MG
1 TABLET ORAL
Qty: 20 | Refills: 0
Start: 2025-07-06 | End: 2025-07-10

## 2025-07-06 RX ORDER — IBUPROFEN 200 MG
800 TABLET ORAL ONCE
Refills: 0 | Status: COMPLETED | OUTPATIENT
Start: 2025-07-06 | End: 2025-07-06

## 2025-07-06 RX ORDER — CYCLOBENZAPRINE HYDROCHLORIDE 15 MG/1
10 CAPSULE, EXTENDED RELEASE ORAL ONCE
Refills: 0 | Status: COMPLETED | OUTPATIENT
Start: 2025-07-06 | End: 2025-07-06

## 2025-07-06 RX ORDER — LIDOCAINE HYDROCHLORIDE 20 MG/ML
1 JELLY TOPICAL
Qty: 5 | Refills: 0
Start: 2025-07-06 | End: 2025-07-10

## 2025-07-06 RX ADMIN — LIDOCAINE HYDROCHLORIDE 1 PATCH: 20 JELLY TOPICAL at 11:18

## 2025-07-06 RX ADMIN — Medication 800 MILLIGRAM(S): at 11:37

## 2025-07-06 RX ADMIN — CYCLOBENZAPRINE HYDROCHLORIDE 10 MILLIGRAM(S): 15 CAPSULE, EXTENDED RELEASE ORAL at 11:18

## 2025-07-06 NOTE — ED PROVIDER NOTE - RESPIRATORY, MLM
PRE-OP DIAGNOSIS:  ACL tear 29-Apr-2022 15:16:40  [facility] Pasc[facility]  
Breath sounds clear and equal bilaterally.

## 2025-07-06 NOTE — ED ADULT NURSE NOTE - CAS TRG GENERAL AIRWAY, MLM
Agreeable to ordering MRI however I or another provider would have to see her in office to order this test       Patent

## 2025-07-06 NOTE — ED ADULT NURSE NOTE - OBJECTIVE STATEMENT
The patient is a 45y Female complaining of hip pain/injury. Pt c/o left hip pain that radiates to lower back and down the back of the leg since last night. Endorses hurting it while dancing at a wedding last night. Pt stating "I think the  hit my leg with a piece of equipment" - meds pta. Pt A&ox4, ambulatory with limp, Pt has hx of bursitis in area.

## 2025-07-06 NOTE — ED PROVIDER NOTE - PATIENT PORTAL LINK FT
You can access the FollowMyHealth Patient Portal offered by [facility] by registering at the following website: [email or web address]. By joining [employer]’s FollowMyHealth portal, you will also be able to view your health information using other applications (apps) compatible with our system.

## 2025-07-06 NOTE — ED ADULT TRIAGE NOTE - CHIEF COMPLAINT QUOTE
Pt c/o left hip pain that radiates to lower back and down the back of the leg since last night. Endorses hurting it while dancing at a wedding last night. - meds pta. Pt A&ox4, ambulatory with limp, no s/s of distress.

## 2025-07-06 NOTE — ED ADULT NURSE NOTE - NSFALLRISKINTERV_ED_ALL_ED
Assistance OOB with selected safe patient handling equipment if applicable/Assistance with ambulation/Communicate fall risk and risk factors to all staff, patient, and family/Monitor gait and stability/Provide visual cue: yellow wristband, yellow gown, etc/Reinforce activity limits and safety measures with patient and family/Call bell, personal items and telephone in reach/Instruct patient to call for assistance before getting out of bed/chair/stretcher/Non-slip footwear applied when patient is off stretcher/[place] to call system/Physically safe environment - no spills, clutter or unnecessary equipment/Purposeful Proactive Rounding/Room/bathroom lighting operational, light cord in reach

## 2025-07-06 NOTE — ED PROVIDER NOTE - OBJECTIVE STATEMENT
Pt is a 45y female with a history of hypothyroidism who presents to the ED for left hip pain after dancing at a wedding last night. Does not remember falling or trauma. The patient states her pain is aggravated by movement but is able to bear weight. Denies any surgical history to the hip. Denies back pain, bowel or bladder incontinence. LMP today.

## 2025-07-25 ENCOUNTER — APPOINTMENT (OUTPATIENT)
Dept: INTERNAL MEDICINE | Facility: CLINIC | Age: 45
End: 2025-07-25